# Patient Record
Sex: FEMALE | Race: WHITE | NOT HISPANIC OR LATINO | Employment: PART TIME | URBAN - METROPOLITAN AREA
[De-identification: names, ages, dates, MRNs, and addresses within clinical notes are randomized per-mention and may not be internally consistent; named-entity substitution may affect disease eponyms.]

---

## 2024-07-22 ENCOUNTER — APPOINTMENT (OUTPATIENT)
Dept: CT IMAGING | Facility: CLINIC | Age: 49
End: 2024-07-22
Attending: EMERGENCY MEDICINE

## 2024-07-22 ENCOUNTER — HOSPITAL ENCOUNTER (OUTPATIENT)
Facility: CLINIC | Age: 49
Setting detail: OBSERVATION
Discharge: HOME | End: 2024-07-23
Attending: EMERGENCY MEDICINE | Admitting: INTERNAL MEDICINE

## 2024-07-22 ENCOUNTER — APPOINTMENT (OUTPATIENT)
Dept: CARDIOLOGY | Facility: CLINIC | Age: 49
End: 2024-07-22
Attending: STUDENT IN AN ORGANIZED HEALTH CARE EDUCATION/TRAINING PROGRAM

## 2024-07-22 ENCOUNTER — APPOINTMENT (OUTPATIENT)
Dept: ULTRASOUND IMAGING | Facility: CLINIC | Age: 49
End: 2024-07-22
Attending: EMERGENCY MEDICINE

## 2024-07-22 DIAGNOSIS — D64.9 SYMPTOMATIC ANEMIA: ICD-10-CM

## 2024-07-22 DIAGNOSIS — E03.9 HYPOTHYROIDISM, UNSPECIFIED TYPE: ICD-10-CM

## 2024-07-22 DIAGNOSIS — R55 SYNCOPE, UNSPECIFIED SYNCOPE TYPE: ICD-10-CM

## 2024-07-22 DIAGNOSIS — N92.1 MENORRHAGIA WITH IRREGULAR CYCLE: Primary | ICD-10-CM

## 2024-07-22 LAB
ABO/RH(D): NORMAL
ACANTHOCYTES BLD QL SMEAR: ABNORMAL
ANION GAP SERPL CALCULATED.3IONS-SCNC: 8 MMOL/L (ref 7–15)
ANTIBODY SCREEN: NEGATIVE
ATRIAL RATE - MUSE: 76 BPM
BASOPHILS # BLD AUTO: 0.1 10E3/UL (ref 0–0.2)
BASOPHILS NFR BLD AUTO: 1 %
BLD PROD TYP BPU: NORMAL
BLOOD COMPONENT TYPE: NORMAL
BUN SERPL-MCNC: 10.4 MG/DL (ref 6–20)
CALCIUM SERPL-MCNC: 8.6 MG/DL (ref 8.8–10.4)
CHLORIDE SERPL-SCNC: 103 MMOL/L (ref 98–107)
CODING SYSTEM: NORMAL
CREAT SERPL-MCNC: 0.73 MG/DL (ref 0.51–0.95)
CROSSMATCH: NORMAL
DACRYOCYTES BLD QL SMEAR: SLIGHT
DIASTOLIC BLOOD PRESSURE - MUSE: NORMAL MMHG
EGFRCR SERPLBLD CKD-EPI 2021: >90 ML/MIN/1.73M2
ELLIPTOCYTES BLD QL SMEAR: ABNORMAL
EOSINOPHIL # BLD AUTO: 0.3 10E3/UL (ref 0–0.7)
EOSINOPHIL NFR BLD AUTO: 5 %
ERYTHROCYTE [DISTWIDTH] IN BLOOD BY AUTOMATED COUNT: 23.9 % (ref 10–15)
FRAGMENTS BLD QL SMEAR: ABNORMAL
GLUCOSE SERPL-MCNC: 128 MG/DL (ref 70–99)
HCG SERPL QL: NEGATIVE
HCO3 SERPL-SCNC: 24 MMOL/L (ref 22–29)
HCT VFR BLD AUTO: 13.6 % (ref 35–47)
HGB BLD-MCNC: 3.5 G/DL (ref 11.7–15.7)
HGB BLD-MCNC: 5.5 G/DL (ref 11.7–15.7)
HGB BLD-MCNC: 6.6 G/DL (ref 11.7–15.7)
HOLD SPECIMEN: NORMAL
IMM GRANULOCYTES # BLD: 0 10E3/UL
IMM GRANULOCYTES NFR BLD: 0 %
INTERPRETATION ECG - MUSE: NORMAL
ISSUE DATE AND TIME: NORMAL
LVEF ECHO: NORMAL
LYMPHOCYTES # BLD AUTO: 1.5 10E3/UL (ref 0.8–5.3)
LYMPHOCYTES NFR BLD AUTO: 21 %
MCH RBC QN AUTO: 16.1 PG (ref 26.5–33)
MCHC RBC AUTO-ENTMCNC: 25.7 G/DL (ref 31.5–36.5)
MCV RBC AUTO: 63 FL (ref 78–100)
MONOCYTES # BLD AUTO: 0.5 10E3/UL (ref 0–1.3)
MONOCYTES NFR BLD AUTO: 7 %
NEUTROPHILS # BLD AUTO: 4.6 10E3/UL (ref 1.6–8.3)
NEUTROPHILS NFR BLD AUTO: 66 %
NRBC # BLD AUTO: 0 10E3/UL
NRBC BLD AUTO-RTO: 0 /100
P AXIS - MUSE: 61 DEGREES
PLAT MORPH BLD: ABNORMAL
PLATELET # BLD AUTO: 481 10E3/UL (ref 150–450)
POTASSIUM SERPL-SCNC: 4 MMOL/L (ref 3.4–5.3)
PR INTERVAL - MUSE: 144 MS
QRS DURATION - MUSE: 88 MS
QT - MUSE: 394 MS
QTC - MUSE: 443 MS
R AXIS - MUSE: 80 DEGREES
RBC # BLD AUTO: 2.17 10E6/UL (ref 3.8–5.2)
RBC MORPH BLD: ABNORMAL
SODIUM SERPL-SCNC: 135 MMOL/L (ref 135–145)
SPECIMEN EXPIRATION DATE: NORMAL
SYSTOLIC BLOOD PRESSURE - MUSE: NORMAL MMHG
T AXIS - MUSE: 48 DEGREES
T4 FREE SERPL-MCNC: 0.65 NG/DL (ref 0.9–1.7)
TSH SERPL DL<=0.005 MIU/L-ACNC: 17.83 UIU/ML (ref 0.3–4.2)
UNIT ABO/RH: NORMAL
UNIT NUMBER: NORMAL
UNIT STATUS: NORMAL
UNIT TYPE ISBT: 5100
VENTRICULAR RATE- MUSE: 76 BPM
WBC # BLD AUTO: 7 10E3/UL (ref 4–11)

## 2024-07-22 PROCEDURE — 99285 EMERGENCY DEPT VISIT HI MDM: CPT | Mod: 25

## 2024-07-22 PROCEDURE — 36430 TRANSFUSION BLD/BLD COMPNT: CPT

## 2024-07-22 PROCEDURE — 258N000003 HC RX IP 258 OP 636: Performed by: EMERGENCY MEDICINE

## 2024-07-22 PROCEDURE — 36415 COLL VENOUS BLD VENIPUNCTURE: CPT | Performed by: STUDENT IN AN ORGANIZED HEALTH CARE EDUCATION/TRAINING PROGRAM

## 2024-07-22 PROCEDURE — 93306 TTE W/DOPPLER COMPLETE: CPT | Mod: 26 | Performed by: INTERNAL MEDICINE

## 2024-07-22 PROCEDURE — 84703 CHORIONIC GONADOTROPIN ASSAY: CPT | Performed by: EMERGENCY MEDICINE

## 2024-07-22 PROCEDURE — 87624 HPV HI-RISK TYP POOLED RSLT: CPT | Performed by: OBSTETRICS & GYNECOLOGY

## 2024-07-22 PROCEDURE — 86900 BLOOD TYPING SEROLOGIC ABO: CPT | Performed by: EMERGENCY MEDICINE

## 2024-07-22 PROCEDURE — 250N000013 HC RX MED GY IP 250 OP 250 PS 637: Performed by: STUDENT IN AN ORGANIZED HEALTH CARE EDUCATION/TRAINING PROGRAM

## 2024-07-22 PROCEDURE — 76830 TRANSVAGINAL US NON-OB: CPT

## 2024-07-22 PROCEDURE — 84443 ASSAY THYROID STIM HORMONE: CPT | Performed by: STUDENT IN AN ORGANIZED HEALTH CARE EDUCATION/TRAINING PROGRAM

## 2024-07-22 PROCEDURE — 84439 ASSAY OF FREE THYROXINE: CPT | Performed by: STUDENT IN AN ORGANIZED HEALTH CARE EDUCATION/TRAINING PROGRAM

## 2024-07-22 PROCEDURE — 88305 TISSUE EXAM BY PATHOLOGIST: CPT | Mod: 26 | Performed by: STUDENT IN AN ORGANIZED HEALTH CARE EDUCATION/TRAINING PROGRAM

## 2024-07-22 PROCEDURE — 85018 HEMOGLOBIN: CPT | Performed by: EMERGENCY MEDICINE

## 2024-07-22 PROCEDURE — 250N000013 HC RX MED GY IP 250 OP 250 PS 637: Performed by: EMERGENCY MEDICINE

## 2024-07-22 PROCEDURE — 85018 HEMOGLOBIN: CPT | Performed by: STUDENT IN AN ORGANIZED HEALTH CARE EDUCATION/TRAINING PROGRAM

## 2024-07-22 PROCEDURE — 93306 TTE W/DOPPLER COMPLETE: CPT

## 2024-07-22 PROCEDURE — P9016 RBC LEUKOCYTES REDUCED: HCPCS | Performed by: EMERGENCY MEDICINE

## 2024-07-22 PROCEDURE — 96361 HYDRATE IV INFUSION ADD-ON: CPT | Mod: 59

## 2024-07-22 PROCEDURE — 120N000001 HC R&B MED SURG/OB

## 2024-07-22 PROCEDURE — 76856 US EXAM PELVIC COMPLETE: CPT

## 2024-07-22 PROCEDURE — 70450 CT HEAD/BRAIN W/O DYE: CPT

## 2024-07-22 PROCEDURE — G0145 SCR C/V CYTO,THINLAYER,RESCR: HCPCS | Performed by: OBSTETRICS & GYNECOLOGY

## 2024-07-22 PROCEDURE — P9016 RBC LEUKOCYTES REDUCED: HCPCS | Performed by: STUDENT IN AN ORGANIZED HEALTH CARE EDUCATION/TRAINING PROGRAM

## 2024-07-22 PROCEDURE — 86923 COMPATIBILITY TEST ELECTRIC: CPT | Performed by: STUDENT IN AN ORGANIZED HEALTH CARE EDUCATION/TRAINING PROGRAM

## 2024-07-22 PROCEDURE — 36415 COLL VENOUS BLD VENIPUNCTURE: CPT | Performed by: EMERGENCY MEDICINE

## 2024-07-22 PROCEDURE — 99223 1ST HOSP IP/OBS HIGH 75: CPT | Performed by: STUDENT IN AN ORGANIZED HEALTH CARE EDUCATION/TRAINING PROGRAM

## 2024-07-22 PROCEDURE — 85025 COMPLETE CBC W/AUTO DIFF WBC: CPT | Performed by: EMERGENCY MEDICINE

## 2024-07-22 PROCEDURE — 86923 COMPATIBILITY TEST ELECTRIC: CPT | Performed by: EMERGENCY MEDICINE

## 2024-07-22 PROCEDURE — 80048 BASIC METABOLIC PNL TOTAL CA: CPT | Performed by: EMERGENCY MEDICINE

## 2024-07-22 PROCEDURE — 99207 PR SERVICE NOT STAFFED W/SUPERV PROV: CPT | Performed by: OBSTETRICS & GYNECOLOGY

## 2024-07-22 PROCEDURE — 88305 TISSUE EXAM BY PATHOLOGIST: CPT | Mod: TC | Performed by: OBSTETRICS & GYNECOLOGY

## 2024-07-22 PROCEDURE — 93005 ELECTROCARDIOGRAM TRACING: CPT

## 2024-07-22 PROCEDURE — 96360 HYDRATION IV INFUSION INIT: CPT | Mod: 59

## 2024-07-22 RX ORDER — AMOXICILLIN 250 MG
2 CAPSULE ORAL 2 TIMES DAILY PRN
Status: DISCONTINUED | OUTPATIENT
Start: 2024-07-22 | End: 2024-07-23 | Stop reason: HOSPADM

## 2024-07-22 RX ORDER — NORGESTIMATE AND ETHINYL ESTRADIOL 0.25-0.035
1 KIT ORAL DAILY
Status: DISCONTINUED | OUTPATIENT
Start: 2024-07-22 | End: 2024-07-23 | Stop reason: HOSPADM

## 2024-07-22 RX ORDER — OXYCODONE HYDROCHLORIDE 5 MG/1
5 TABLET ORAL ONCE
Status: COMPLETED | OUTPATIENT
Start: 2024-07-22 | End: 2024-07-22

## 2024-07-22 RX ORDER — ACETAMINOPHEN 325 MG/1
650 TABLET ORAL EVERY 4 HOURS PRN
Status: DISCONTINUED | OUTPATIENT
Start: 2024-07-22 | End: 2024-07-23 | Stop reason: HOSPADM

## 2024-07-22 RX ORDER — B-COMPLEX WITH VITAMIN C
2-3 TABLET ORAL DAILY
COMMUNITY

## 2024-07-22 RX ORDER — LEVOTHYROXINE SODIUM 100 UG/1
100 TABLET ORAL
Status: DISCONTINUED | OUTPATIENT
Start: 2024-07-23 | End: 2024-07-23 | Stop reason: HOSPADM

## 2024-07-22 RX ORDER — ACETAMINOPHEN 650 MG/1
650 SUPPOSITORY RECTAL EVERY 4 HOURS PRN
Status: DISCONTINUED | OUTPATIENT
Start: 2024-07-22 | End: 2024-07-23 | Stop reason: HOSPADM

## 2024-07-22 RX ORDER — CALCIUM CARBONATE 500 MG/1
1000 TABLET, CHEWABLE ORAL 4 TIMES DAILY PRN
Status: DISCONTINUED | OUTPATIENT
Start: 2024-07-22 | End: 2024-07-23 | Stop reason: HOSPADM

## 2024-07-22 RX ORDER — LIDOCAINE 40 MG/G
CREAM TOPICAL
Status: DISCONTINUED | OUTPATIENT
Start: 2024-07-22 | End: 2024-07-23 | Stop reason: HOSPADM

## 2024-07-22 RX ORDER — ONDANSETRON 2 MG/ML
4 INJECTION INTRAMUSCULAR; INTRAVENOUS EVERY 6 HOURS PRN
Status: DISCONTINUED | OUTPATIENT
Start: 2024-07-22 | End: 2024-07-23 | Stop reason: HOSPADM

## 2024-07-22 RX ORDER — AMOXICILLIN 250 MG
1 CAPSULE ORAL 2 TIMES DAILY PRN
Status: DISCONTINUED | OUTPATIENT
Start: 2024-07-22 | End: 2024-07-23 | Stop reason: HOSPADM

## 2024-07-22 RX ORDER — ONDANSETRON 4 MG/1
4 TABLET, ORALLY DISINTEGRATING ORAL EVERY 6 HOURS PRN
Status: DISCONTINUED | OUTPATIENT
Start: 2024-07-22 | End: 2024-07-23 | Stop reason: HOSPADM

## 2024-07-22 RX ADMIN — OXYCODONE HYDROCHLORIDE 5 MG: 5 TABLET ORAL at 13:41

## 2024-07-22 RX ADMIN — ACETAMINOPHEN 650 MG: 325 TABLET, FILM COATED ORAL at 21:54

## 2024-07-22 RX ADMIN — NORGESTIMATE AND ETHINYL ESTRADIOL 1 TABLET: KIT at 16:32

## 2024-07-22 RX ADMIN — SODIUM CHLORIDE 1000 ML: 9 INJECTION, SOLUTION INTRAVENOUS at 06:59

## 2024-07-22 ASSESSMENT — ACTIVITIES OF DAILY LIVING (ADL)
ADLS_ACUITY_SCORE: 35
ADLS_ACUITY_SCORE: 25
ADLS_ACUITY_SCORE: 35
ADLS_ACUITY_SCORE: 22
ADLS_ACUITY_SCORE: 25
ADLS_ACUITY_SCORE: 23
ADLS_ACUITY_SCORE: 35
ADLS_ACUITY_SCORE: 23
ADLS_ACUITY_SCORE: 35

## 2024-07-22 ASSESSMENT — COLUMBIA-SUICIDE SEVERITY RATING SCALE - C-SSRS
2. HAVE YOU ACTUALLY HAD ANY THOUGHTS OF KILLING YOURSELF IN THE PAST MONTH?: NO
1. IN THE PAST MONTH, HAVE YOU WISHED YOU WERE DEAD OR WISHED YOU COULD GO TO SLEEP AND NOT WAKE UP?: NO
6. HAVE YOU EVER DONE ANYTHING, STARTED TO DO ANYTHING, OR PREPARED TO DO ANYTHING TO END YOUR LIFE?: NO

## 2024-07-22 NOTE — CONSULTS
Gynecologic Oncology Consult Note    Name: Aleida Hagan    MRN: [id number]   YOB: 1975         We were asked to see Aleida Hagan at the request of Dr. Pham for evaluation and treatment of abnormal uterine bleeding.        Chief Complaint:   Heavy vaginal bleeding   Loss of consciousness    History is obtained from the patient          History of Present Illness:   Aleida Hagan is a 49 year old  female who is being seen for evaluation of heavy vaginal bleeding.     Pt presents to the ED after an episode of loss of consciousness while in the shower earlier today. Stanford lightheaded any time she lifted her hands above her head and then became acutely dizzy and and had LOC, falling out of the shower onto the floor. Not sure if she hit her head. Daughter heard her hit the floor and arrived shortly thereafter, believes the pt was unconscious for 60-90 seconds total. Pt remembers events leading up to the fall. Not having significant pain thereafter.     This happened in the context of recent h/o 3 weeks of heavy vaginal bleeding. Pt has a long h/o menorrhagia and states periods for her typically last 2 weeks and are associated with pelvic pain, this has been ongoing for many years. She gets one period per month, does not always arrive the day she expects it to (reports overall 21-35 d cycles). In recent months, has been noticing increase in weakness and shortness of breath with activity, occasionally feeling lightheaded with walking even short distances (1/2 city block). This has been slowly worsening over time so she did not think much of it, figured normal. She did not have a period in  which was abnormal for her, but she figured that this may have meant she was approaching menopause. Has noticed some heat/cold intolerance in the last year and overall periods have been a bit shorter than previously which she felt were likely signs of menopause as well. At the beginning of July, a  period started and was even heavier than typical for her. In the first few days she had one episode of passing large clots, enough to fill both her hands. She subsequently had heavy ongoing bleeding requiring her to stay seated on the toilet because it was so heavy. She was bleeding through a super tampon in 15 minutes. This got a bit lighter through the month but remained fairly heavy and bothersome and her symptoms of anemia (lightheadedness, shortness of breath, weakness) worsened throughout the month. 3 days ago she elected to start taking some of her daughters prescribed cOCPs (tri-sprintec) which improved her bleeding significantly, though it has still been present. This morning she was actually feeling much better from a lightheadedness standpoint, until her episode of LOC in the shower which prompted her to come in.          Obstetric History:     H/o  x2   H/o tubal ectopic x1 tx with surgery ()           Past Medical History:   No past medical history on file.  Reports h/o goiter, previously on Synthroid (has not taken x20 years)  H/o frequent kidney infections in early adulthood   H/o depression per chart review  H/o endometriosis          Past Surgical History:   No past surgical history on file.  H/o postpartum tubal ligation  H/o tubal surgery for ectopic pregnancy, pt unsure what surgery exactly was done but reports subsequent pelvic infections          Social History:     Social History     Tobacco Use    Smoking status: Not on file    Smokeless tobacco: Not on file   Substance Use Topics    Alcohol use: Not on file            Family History:   No family history of  cancers (ovarian, cervical, uterine, breast or colon). No family h/o VTE.          Allergies:   No Known Allergies         Medications:     No current facility-administered medications for this encounter.     Current Outpatient Medications   Medication Sig Dispense Refill    MAGNESIUM GLYCINATE PO Take 2 mLs by mouth daily  600 mg glycinate  300 mg taurate  300 mg citrate      NONFORMULARY Lion's Volodymyr drops daily      Pediatric Multivitamins-Iron (FRUITY CHEWS/IRON) CHEW Take 4-5 chew tab by mouth daily Ferrous fumarate 18 mg per 3 gummies  Also contains, Niacin, Vitamin B6, Folate, vitamin B12, pantothenic acid, zinc               Review of Systems:    ROS: 10 point ROS negative other than those noted above in the HPI.       Physical Exam:     Vitals:    07/22/24 1117 07/22/24 1132 07/22/24 1202 07/22/24 1217   BP: (!) 144/74 120/78 139/71    Pulse: 72 65 87 73   Resp: 13 16 12 16   Temp:       TempSrc:       SpO2:  96%  100%     General: NAD, good color, appears comfortable  Resp: no respiratory distress, breathing comfortably on room air   CV: heart rate regular, warm and well perfused   Abdomen: soft, non-distended, non tender. =  : normal external genitalia. Normal vaginal mucosa. Cervix appears multiparous and large without masses or lesions. No active bleeding but small amount of dark blood noted in the posterior fornix. Bimanual exam notable for enlarged (10-12wk size) uterus slightly deviated to pt left with mild fundal tenderness to palpation.   Ext: Warm, well perfused, no edema  Neuro: appears intact grossly moving all 4 extremities equally.  Skin: No rashes or ecchymoses noted.      Endometrial biopsy:   A speculum was placed in the vagina. A small amount of active bleeding was noted at the external os. A pap smear was collected in the usual fashion. A tenaculum was placed at 12 o'clock on the anterior lip of the cervix. An endometrial pipelle was inserted into the endometrial cavity which sounded to a length of 7 cm. Am endometrial biopsy was collected. A second pass was completed. The sample appeared to include both blood and tissue. The tenaculum was removed. A small amount of ongoing bleeding was noted at end of the procedure. The samples were sent to pathology. Pt tolerated the procedure well.       Data     Results  for orders placed or performed during the hospital encounter of 07/22/24 (from the past 24 hour(s))   EKG 12-lead, tracing only   Result Value Ref Range    Systolic Blood Pressure  mmHg    Diastolic Blood Pressure  mmHg    Ventricular Rate 76 BPM    Atrial Rate 76 BPM    SC Interval 144 ms    QRS Duration 88 ms     ms    QTc 443 ms    P Axis 61 degrees    R AXIS 80 degrees    T Axis 48 degrees    Interpretation ECG       Sinus rhythm  Normal ECG  No previous ECGs available  Confirmed by GENERATED REPORT, COMPUTER ([id number]),  Nadine Matthews ([id number]) on 7/22/2024 8:58:32 AM     CBC with platelets differential    Narrative    The following orders were created for panel order CBC with platelets differential.  Procedure                               Abnormality         Status                     ---------                               -----------         ------                     CBC with platelets and d...[[id number]]  Abnormal            Final result               RBC and Platelet Morphology[[id number]]  Abnormal            Final result                 Please view results for these tests on the individual orders.   ABO/Rh type and screen    Narrative    The following orders were created for panel order ABO/Rh type and screen.  Procedure                               Abnormality         Status                     ---------                               -----------         ------                     Adult Type and Screen[[id number]]                            Final result                 Please view results for these tests on the individual orders.   Basic metabolic panel   Result Value Ref Range    Sodium 135 135 - 145 mmol/L    Potassium 4.0 3.4 - 5.3 mmol/L    Chloride 103 98 - 107 mmol/L    Carbon Dioxide (CO2) 24 22 - 29 mmol/L    Anion Gap 8 7 - 15 mmol/L    Urea Nitrogen 10.4 6.0 - 20.0 mg/dL    Creatinine 0.73 0.51 - 0.95 mg/dL    GFR Estimate >90 >60 mL/min/1.73m2    Calcium 8.6 (L) 8.8 - 10.4 mg/dL     Glucose 128 (H) 70 - 99 mg/dL   HCG qualitative Blood   Result Value Ref Range    hCG Serum Qualitative Negative Negative   [place] Draw    Narrative    The following orders were created for panel order [place] Draw.  Procedure                               Abnormality         Status                     ---------                               -----------         ------                     Extra Blue Top Tube[[id number]]                              Final result                 Please view results for these tests on the individual orders.   CBC with platelets and differential   Result Value Ref Range    WBC Count 7.0 4.0 - 11.0 10e3/uL    RBC Count 2.17 (L) 3.80 - 5.20 10e6/uL    Hemoglobin 3.5 (LL) 11.7 - 15.7 g/dL    Hematocrit 13.6 (L) 35.0 - 47.0 %    MCV 63 (L) 78 - 100 fL    MCH 16.1 (L) 26.5 - 33.0 pg    MCHC 25.7 (L) 31.5 - 36.5 g/dL    RDW 23.9 (H) 10.0 - 15.0 %    Platelet Count 481 (H) 150 - 450 10e3/uL    % Neutrophils 66 %    % Lymphocytes 21 %    % Monocytes 7 %    % Eosinophils 5 %    % Basophils 1 %    % Immature Granulocytes 0 %    NRBCs per 100 WBC 0 <1 /100    Absolute Neutrophils 4.6 1.6 - 8.3 10e3/uL    Absolute Lymphocytes 1.5 0.8 - 5.3 10e3/uL    Absolute Monocytes 0.5 0.0 - 1.3 10e3/uL    Absolute Eosinophils 0.3 0.0 - 0.7 10e3/uL    Absolute Basophils 0.1 0.0 - 0.2 10e3/uL    Absolute Immature Granulocytes 0.0 <=0.4 10e3/uL    Absolute NRBCs 0.0 10e3/uL    Narrative    Sent for Review by Pathologist. Review comments will be entered. Results will be updated after review as applicable.  Slide reviewed by pathologist, VEGA RODAS MD on 7/22/24         Adult Type and Screen   Result Value Ref Range    ABO/RH(D) O POS     Antibody Screen Negative Negative    SPECIMEN EXPIRATION DATE [id number]    Extra Blue Top Tube   Result Value Ref Range    Hold Specimen [facility]    RBC and Platelet Morphology   Result Value Ref Range    RBC Morphology Confirmed RBC Indices     Platelet Assessment  Automated  Count Confirmed. Platelet morphology is normal.     Automated Count Confirmed. Platelet morphology is normal.    Acanthocytes Moderate (A) None Seen    Elliptocytes Moderate (A) None Seen    RBC Fragments Moderate (A) None Seen    Teardrop Cells Slight (A) None Seen   Prepare red blood cells (unit)   Result Value Ref Range    Blood Component Type Red Blood Cells     Product Code O7688C30     Unit Status Transfused     Unit Number [id number]     CROSSMATCH Compatible     CODING SYSTEM [id number]     ISSUE DATE AND TIME 14175005508384     UNIT ABO/RH O+     UNIT TYPE ISBT 5100    Prepare red blood cells (unit)   Result Value Ref Range    Blood Component Type Red Blood Cells     Product Code M4697W47     Unit Status Transfused     Unit Number [id number]     CROSSMATCH Compatible     CODING SYSTEM [id number]     ISSUE DATE AND TIME 39020226592465     UNIT ABO/RH O+     UNIT TYPE ISBT 5100    US Pelvis Cmplt w Transvag & Doppler LmtPel Duplex Limited    Narrative    US PELVIS COMPLETE W TRANSVAGINAL AND DOPPLER LIMITED 7/22/2024 8:11  AM    CLINICAL HISTORY: Pelvic pain  TECHNIQUE: Transabdominal scans were performed. Endovaginal ultrasound  was performed to better visualize the adnexa. Color and spectral  Doppler images of both ovaries were also performed.    COMPARISON: None.    FINDINGS:    UTERUS: 12.4 x 10.5 x 10.3 cm. Enlarged in normal position. The  uterine body and fundus is obscured with questionable associated  fibroid measuring up to 9.3 cm. Multiple nabothian cysts in the  cervix.    ENDOMETRIUM: Not well visualized.    RIGHT OVARY: 5.5 x 3.8 x 2.2 cm. Cystic lesion in the right ovary  measuring up to 4.6 cm. While this is primarily simple-appearing,  there may be a small peripheral calcified nodule. Normal arterial and  venous waveforms are identified in the peripheral ovarian parenchyma.    LEFT OVARY: Not visualized, likely obscured by overlying bowel gas.    No significant free fluid.      Impression     IMPRESSION:  1.  Enlarged, heterogeneous uterus, could be replaced by a large  fibroid versus adenomyoma.  2.  Endometrium is not well visualized, most likely obscured by #1 but  could consider further evaluation with MRI to include underlying mass  or lesion, if clinically indicated on a nonemergent/outpatient basis.  3.  Relatively simple appearing right ovarian cystic lesion with  possible small peripheral calcification, consider attention on  follow-up imaging. No evidence of right ovarian torsion  4.  Nonvisualized left ovary.    ROSA SHARMA MD         SYSTEM ID:  [id number]   CT Head w/o Contrast    Narrative    CT SCAN OF THE HEAD WITHOUT CONTRAST   2024 9:22 AM     HISTORY: Fall, syncope.    TECHNIQUE:  Axial images of the head and coronal reformations without  IV contrast material. Radiation dose for this scan was reduced using  automated exposure control, adjustment of the mA and/or kV according  to patient size, or iterative reconstruction technique.    COMPARISON: None.    FINDINGS: There is no evidence of intracranial hemorrhage, mass, acute  infarct or anomaly. The ventricles are normal in size, shape and  configuration. The brain parenchyma and subarachnoid spaces are  normal.     The visualized portions of the sinuses and mastoids appear normal. The  bony calvarium and bones of the skull base appear intact.       Impression    IMPRESSION:  No evidence of acute intracranial hemorrhage, mass, or  herniation.     JO-ANN GRAJEDA MD         SYSTEM ID:  [id number]       Assessment and Recommendations   Impression:   Aleida Hagan is a 49 year old  female who presents after an episode of loss of consciousness in the setting of acute on chronic heavy uterine bleeding. On admission, work up notable for significant anemia with hgb 3.5 and pelvic US showing an enlarged (12 cm), heterogenous uterus c/w fibroid vs adenomyosis without good visualization of the endometrium. Pt endorses long history  of semi-regular cycles with menorrhagia. Bleeding in July heavier than her baseline and has lasted 3 weeks while typical cycles for her are 1-2. Given the amount of bleeding she describes this month and in previous, suspect she has chronically anemic for some time, with acute worsening in the setting of even heavier than typical vaginal bleeding. On my review of US findings appears c/w adenomyosis which would explain her long h/o heavy and painful periods. She will require endometrial sampling to r/o malignancy in the setting of very heavy bleeding in the perimenopausal period. Final recommendations would be based on histologic diagnosis, however even if adenomyosis, definitive management would be w/ hysterectomy though other non-surgical options exist as well. For her acute blood loss anemia, recommend continuation of cOCPs (which pt started taking in the last 3 days to good effect). Would obtain labs to evaluate thyroid function as hypothyroidism can contribute to AUB as well and pt endorses h/o significant thyroid disease.       Plan:   - admit to medicine  - agree with transfusion to hgb>7 per primary team   - start continuous combined OCPs today for management of heavy bleeding (sprintec is an appropriate option)   - consider course of scheduled NSAIDs which can also help w/ discomfort/bleeding of adenomyosis  - obtain thyroid function labs   - f/up endometrial biopsy  - f/up pap w/ HPV co-testing      The above recommendations were personally communicated to pts primary inpatient provider Dr. Pham     Thank you for allowing us to be involved in the care of your patient. Gyn/onc will continue to follow. Please do not hesitate to give us a call with further questions.     Gyn Onc Team Pager:  630 AM-6PM: [phone]  6PM-630 AM: [phone]     Patient care plan discussed under supervision of Dr. Dunne.    Lovely Coreas MD  OB/GYN PGY-3  7/22/2024 2:12 PM        Discussed patient with resident.  Pap with EMB  performed.  Recommend TSH level.  Transfuse per medicine to Hb >7.  Recommend starting OCP for cycle regulation.  Await pathology for final treatment recommendations.    Suyapa Dunne MD  Gynecologic Oncology  [facility]

## 2024-07-22 NOTE — ED PROVIDER NOTES
Emergency Department Note      History of Present Illness     Chief Complaint   Syncope      HPI   Aleida Hagan is a 49 year old female who presents to the ED following a syncopal episode. The patient reports that she has been having heavy vaginal bleeding for the last 3 weeks which has caused her to have severe dizziness and lightheadedness. She explains that 2.5 weeks ago she had a significant clot passed that has progressed to smaller, persistent clots with her bleeding. Today she reports that she was in the shower this morning when she realized she could not reach her hand above her head without feeling lightheaded, so she sat on the floor. After resting for a few minutes she got up and reached for a towel when she felt lightheaded and had a syncopal episode, falling to the bathroom floor. Daughter reports that she was out for about a minute before she was assisted up. She endorses shoulder pain after the fall but denies headache and new neck pain. For the last 3 days she has taken her daughter's birth control which has helped her bleeding somewhat. She was diagnosed with endometriosis when she was 28, and she also has history of tubal ligation. She is not on blood thinners.     Independent Historian   None    Review of External Notes   occ med note from 9/17/2021    Past Medical History     Medical History and Problem List   Endometriosis    Medications   The patient is not currently taking any prescribed medications.    Surgical History:  Tubal ligation    Physical Exam     Patient Vitals for the past 24 hrs:   BP Temp Temp src Pulse Resp SpO2   07/22/24 0904 115/70 97.7  F (36.5  C) Oral 67 18 95 %   07/22/24 0847 116/69 97.9  F (36.6  C) Oral 71 18 94 %   07/22/24 0845 116/69 -- -- 66 11 98 %   07/22/24 0828 107/63 97.9  F (36.6  C) -- 72 18 --   07/22/24 0722 -- -- -- 74 18 94 %   07/22/24 0715 -- -- -- 70 10 94 %   07/22/24 0712 -- -- -- 71 14 92 %   07/22/24 0702 108/50 -- -- 74 26 --   07/22/24 0635  100/45 97.8  F (36.6  C) Oral 83 14 99 %     Physical Exam  General: Patient is alert and normal appearing.  HEENT: Head atraumatic    Eyes: pupils equal and reactive. Conjunctiva clear   Nares: patent   Oropharynx: no lesions, uvula midline, no palatal draping, normal voice, no trismus  Neck: Supple without lymphadenopathy, no meningismus  Chest: Heart regular rate and rhythm.   Lungs: Equal clear to auscultation with no wheeze or rales  Abdomen: Soft, non tender, nondistended, normal bowel sounds  Back: No costovertebral angle tenderness, no midline C, T or L spine tenderness  Neuro: Grossly nonfocal, normal speech, strength equal bilaterally, CN 2-12 intact  Extremities: No deformities, equal radial and DP pulses. No clubbing, cyanosis.  No edema  Skin: Pale, warm and dry with no rash.       Diagnostics     Lab Results   Labs Ordered and Resulted from Time of ED Arrival to Time of ED Departure   BASIC METABOLIC PANEL - Abnormal       Result Value    Sodium 135      Potassium 4.0      Chloride 103      Carbon Dioxide (CO2) 24      Anion Gap 8      Urea Nitrogen 10.4      Creatinine 0.73      GFR Estimate >90      Calcium 8.6 (*)     Glucose 128 (*)    CBC WITH PLATELETS AND DIFFERENTIAL - Abnormal    WBC Count 7.0      RBC Count 2.17 (*)     Hemoglobin 3.5 (*)     Hematocrit 13.6 (*)     MCV 63 (*)     MCH 16.1 (*)     MCHC 25.7 (*)     RDW 23.9 (*)     Platelet Count 481 (*)     NRBCs per 100 WBC 0      Absolute NRBCs 0.0     RBC AND PLATELET MORPHOLOGY - Abnormal    RBC Morphology Confirmed RBC Indices      Platelet Assessment        Value: Automated Count Confirmed. Platelet morphology is normal.    Acanthocytes Moderate (*)     Elliptocytes Moderate (*)     RBC Fragments Moderate (*)     Teardrop Cells Slight (*)    HCG QUALITATIVE PREGNANCY - Normal    hCG Serum Qualitative Negative     TYPE AND SCREEN, ADULT    ABO/RH(D) O POS      Antibody Screen Negative      SPECIMEN EXPIRATION DATE [id number]      PREPARE RED BLOOD CELLS (UNIT)    Blood Component Type Red Blood Cells      Product Code K3926B82      Unit Status Transfused      Unit Number [id number]      CROSSMATCH Compatible      CODING SYSTEM [id number]      ISSUE DATE AND TIME 52915472369338      UNIT ABO/RH O+      UNIT TYPE ISBT 5100     PREPARE RED BLOOD CELLS (UNIT)    Blood Component Type Red Blood Cells      Product Code B2146C25      Unit Status Ready for issue      Unit Number [id number]      CROSSMATCH Compatible      CODING SYSTEM [id number]     PREPARE RED BLOOD CELLS (UNIT)   TRANSFUSE RED BLOOD CELLS (UNIT)   TRANSFUSE RED BLOOD CELLS (UNIT)   ABO/RH TYPE AND SCREEN       Imaging   US Pelvis Cmplt w Transvag & Doppler LmtPel Duplex Limited   Final Result   IMPRESSION:   1.  Enlarged, heterogeneous uterus, could be replaced by a large   fibroid versus adenomyoma.   2.  Endometrium is not well visualized, most likely obscured by #1 but   could consider further evaluation with MRI to include underlying mass   or lesion, if clinically indicated on a nonemergent/outpatient basis.   3.  Relatively simple appearing right ovarian cystic lesion with   possible small peripheral calcification, consider attention on   follow-up imaging. No evidence of right ovarian torsion   4.  Nonvisualized left ovary.      ROSA SHARMA MD            SYSTEM ID:  [id number]      CT Head w/o Contrast    (Results Pending)       EKG   ECG taken at 0651, ECG read at 0652  Normal sinus rhythm   Normal ECG   Rate 76 bpm. FL interval 144 ms. QRS duration 88 ms. QT/QTc 394/443 ms. P-R-T axes 61 80 48.    Independent Interpretation   CT Head: No intracranial hemorrhage.    ED Course      Medications Administered   Medications   sodium chloride 0.9% BOLUS 1,000 mL (0 mLs Intravenous Stopped 7/22/24 0835)       Procedures   Procedures     Discussion of Management   0906 I spoke with Dr. Pham, hospitalist, who accepts the patient.      ED Course   ED Course as of 07/22/24 0911    Mon Jul 22, 2024   0642 I obtained history and examined the patient as noted above   0813 I rechecked the patient and explained findings.         Optional/Additional Documentation  None    Medical Decision Making / Diagnosis     CMS Diagnoses: None    MIPS       None    MDM   Aleida Hagan is a 49 year old female presents emergency department syncope.  She has had increased fatigue with exertion following 3 weeks of heavy menstrual bleeding.  States she has irregular menstrual periods and felt she was likely perimenopausal.  History of heavy periods for a while but this was more extreme and longer in duration.  Today she was in the shower and had prodrome of syncope and ultimately had a syncopal episode.  Unclear if she hit her head.  Head CT to me shows no intracranial hemorrhage but final read is pending at this time.  Pelvic ultrasound with enlarged heterogeneous uterus with possible large fibroid versus adenoma.  Likely representing her menorrhagia.  Patient was found to be anemic with a hemoglobin of 3.5.  Hemodynamically stable.  Agrees to transfusion and 2 units PRBCs are ordered.  Will plan to admit for transfusion and OB consultation for menorrhagia.  Patient is agreeable with plan for admission.  All questions and concerns addressed.    Disposition   The patient was admitted to the hospital.     Diagnosis     ICD-10-CM    1. Menorrhagia with irregular cycle  N92.1       2. Symptomatic anemia  D64.9       3. Syncope, unspecified syncope type  R55              Scribe Disclosure:  IInder, am serving as a scribe  for Jah Yu at 9:12 AM on 7/22/2024  I, Jah Yu, am serving as a scribe at 9:12 AM on 7/22/2024 to document services personally performed by Kelly Souza MD based on my observations and the provider's statements to me.        Kelly Souza MD  07/22/24 8959

## 2024-07-22 NOTE — LETTER
[place] [address] [facility]  [address]  [facility] [address]  Phone: [phone]    July 23, 2024        Aleida Hagan  [address]   [facility] [address]          To whom it may concern:    RE: Aleida Hagan    Patient was hospitalized for medical necessity from 7/22/24-7/23/24.  She needs to be off work until Monday, 7/29/24.  She can return to work on 7/29/24 without restrictions.     Please contact me for questions or concerns.      Sincerely,      Martha Urena D.O.

## 2024-07-22 NOTE — H&P
[facility]    History and Physical - Hospitalist Service       Date of Admission:  7/22/2024    Assessment & Plan      Aleida Hagan is a 49 year old female with past medical history significant for depression/anxiety and hypothyroidism admitted on 7/22/2024 with abnormal uterine bleeding and acute blood loss anemia.     Abnormal uterine bleeding   Acute blood loss anemia   Hx of endometriosis, menorrhagia   Pt presented to the ED after a syncopal episode (see below). She reported for the past 3-4 weeks she has been having very heavy vaginal bleeding which has caused her to have dizziness and light-headedness. She does note a history of endometriosis and heavy periods at baseline, but this is much worse than normal.  * Initial hemoglobin in the ED is 3.5  *Pelvic ultrasound performed and showed an enlarged, heterogeneous uterus, which could be replaced by large fibroid versus adenomyoma.  The endometrium is not well-visualized.   * Pt transfused 2 units of PRBCs in the ED  -Admit to inpatient  -Gynecology consult for consideration of hormonal therapy and additional workup/treatment regarding abnormal imaging findings.  -Trend hemoglobin  -Transfuse PRBCs for hemoglobin less than 7 conditional orders placed      Syncope 2/2 above  Patient presents to the emergency department after syncopal episode.  She reports she was in the shower this morning started feeling quite lightheaded/dizzy she actually sat down in the shower intentionally due to the dizziness but then when she tried to stand back up she syncopized.  * Syncope is certainly 2/2 severe anemia, however pt does report a family hx of cardiac disease.   * No reported injury as a result of the fall   - Cardiac monitoring  - Transfuse for hgb <7 as noted above   - Given family cardiac hx will obtain echo for completeness     ADDENDUM:   Hypothyroidism  TSH elevated to 17.83. T4 low at 0.65.   - Start Levothyroxine 100mcg daily.      Financial Concerns  Pt does not have insurance   - Social Work consult appreciated     Diet: Regular Diet   DVT Prophylaxis: Pneumatic Compression Devices  Galaviz Catheter: Not present  Lines: None     Cardiac Monitoring: None  Code Status:Full Code     Clinically Significant Risk Factors Present on Admission                     # Anemia: based on hgb <11        Disposition Plan     Medically Ready for Discharge: Anticipated in 2-4 Days           Georgie Pham MD  Hospitalist Service  [facility]  Securely message with [employer] (more info)  Text page via [employer] Paging/Directory     ______________________________________________________________________    Chief Complaint   Syncope     History is obtained from the patient    History of Present Illness   Aleida Hagan is a 49 year old female who presents to the emergency department after syncopal episode.  She reports she was in the shower this morning started feeling quite lightheaded/dizzy she actually sat down in the shower intentionally due to the dizziness but then when she tried to stand back up she syncopized. She woke up on the floor with her dog licking her. She does not think she injured herself. She denies any new or significant pain. She does however note that she has been having fairly significant vaginal bleeding for the past 4 weeks. She has a hx of endometriosis and menorrhagia. She denies a hx of anemia, but reports taking iron supplementation when she gets her periods. This current episode has been much more severe than normal in terms of duration and heaviness of bleeding. She did actually start taking some of her daughter's birth control about three days ago to try to slow the bleeding. She did not seek care sooner because she is not insured. She does not get regular pelvic examinations or pap smears for the same reason.       Past Medical History    No past medical history on file.    Past Surgical History   No  past surgical history on file.    Prior to Admission Medications   Prior to Admission Medications   Prescriptions Last Dose Informant Patient Reported? Taking?   MAGNESIUM GLYCINATE PO 7/21/2024  Yes Yes   Sig: Take 2 mLs by mouth daily 600 mg glycinate  300 mg taurate  300 mg citrate   NONFORMULARY 7/21/2024  Yes Yes   Sig: Jarek's Volodymyr drops daily   Pediatric Multivitamins-Iron (FRUITY CHEWS/IRON) CHEW 7/21/2024 at am  Yes Yes   Sig: Take 4-5 chew tab by mouth daily Ferrous fumarate 18 mg per 3 gummies  Also contains, Niacin, Vitamin B6, Folate, vitamin B12, pantothenic acid, zinc      Facility-Administered Medications: None        Review of Systems    The 10 point Review of Systems is negative other than noted in the HPI or here.     Physical Exam   Vital Signs: Temp: 97.9  F (36.6  C) Temp src: Oral BP: 117/63 Pulse: 69   Resp: 18 SpO2: 93 % O2 Device: None (Room air)    Weight: 0 lbs 0 oz    Constitutional: Awake, alert, cooperative, no apparent distress.  Eyes: Conjunctiva and pupils examined and normal.  HEENT: Moist mucous membranes, normal dentition.  Respiratory: Clear to auscultation bilaterally, no crackles or wheezing.  Cardiovascular: Regular rate and rhythm, normal S1 and S2, and II/VI systolic murmur noted.  GI: Soft, non-distended, non-tender, normal bowel sounds.  Skin: No rashes, no cyanosis, no edema.  Musculoskeletal: No joint swelling, erythema or tenderness.  Neurologic: Cranial nerves 2-12 intact, normal strength and sensation.  Psychiatric: Alert, oriented to person, place and time, no obvious anxiety or depression.    Medical Decision Making       75 MINUTES SPENT BY ME on the date of service doing chart review, history, exam, documentation & further activities per the note.      Data     I have personally reviewed the following data over the past 24 hrs:    7.0  \   3.5 (LL)   / 481 (H)     135 103 10.4 /  128 (H)   4.0 24 0.73 \       Imaging results reviewed over the past 24 hrs:   Recent  Results (from the past 24 hour(s))   US Pelvis Cmplt w Transvag & Doppler LmtPel Duplex Limited    Narrative    US PELVIS COMPLETE W TRANSVAGINAL AND DOPPLER LIMITED 7/22/2024 8:11  AM    CLINICAL HISTORY: Pelvic pain  TECHNIQUE: Transabdominal scans were performed. Endovaginal ultrasound  was performed to better visualize the adnexa. Color and spectral  Doppler images of both ovaries were also performed.    COMPARISON: None.    FINDINGS:    UTERUS: 12.4 x 10.5 x 10.3 cm. Enlarged in normal position. The  uterine body and fundus is obscured with questionable associated  fibroid measuring up to 9.3 cm. Multiple nabothian cysts in the  cervix.    ENDOMETRIUM: Not well visualized.    RIGHT OVARY: 5.5 x 3.8 x 2.2 cm. Cystic lesion in the right ovary  measuring up to 4.6 cm. While this is primarily simple-appearing,  there may be a small peripheral calcified nodule. Normal arterial and  venous waveforms are identified in the peripheral ovarian parenchyma.    LEFT OVARY: Not visualized, likely obscured by overlying bowel gas.    No significant free fluid.      Impression    IMPRESSION:  1.  Enlarged, heterogeneous uterus, could be replaced by a large  fibroid versus adenomyoma.  2.  Endometrium is not well visualized, most likely obscured by #1 but  could consider further evaluation with MRI to include underlying mass  or lesion, if clinically indicated on a nonemergent/outpatient basis.  3.  Relatively simple appearing right ovarian cystic lesion with  possible small peripheral calcification, consider attention on  follow-up imaging. No evidence of right ovarian torsion  4.  Nonvisualized left ovary.    ROSA SHARMA MD         SYSTEM ID:  [id number]   CT Head w/o Contrast    Narrative    CT SCAN OF THE HEAD WITHOUT CONTRAST   7/22/2024 9:22 AM     HISTORY: Fall, syncope.    TECHNIQUE:  Axial images of the head and coronal reformations without  IV contrast material. Radiation dose for this scan was reduced  using  automated exposure control, adjustment of the mA and/or kV according  to patient size, or iterative reconstruction technique.    COMPARISON: None.    FINDINGS: There is no evidence of intracranial hemorrhage, mass, acute  infarct or anomaly. The ventricles are normal in size, shape and  configuration. The brain parenchyma and subarachnoid spaces are  normal.     The visualized portions of the sinuses and mastoids appear normal. The  bony calvarium and bones of the skull base appear intact.       Impression    IMPRESSION:  No evidence of acute intracranial hemorrhage, mass, or  herniation.

## 2024-07-22 NOTE — ED NOTES
[facility]    ED Nurse Handoff Report    ED Chief complaint: Syncope      ED Diagnosis:   Final diagnoses:   Menorrhagia with irregular cycle   Symptomatic anemia   Syncope, unspecified syncope type       Code Status: Full Code    Allergies: No Known Allergies    Patient Story:  Pt reports having her period for 4 weeks with heavy bleeding. Today about 1 hour PTA she had a syncopal episode, hitting her head and having LOC for about 1 minute. The pt has been experiencing lightheadedness for a few weeks. US and CT pending. 2 units of blood ordered and started.     Focused Assessment:    Pt is A&Ox4, normally independent, today is 1x staff assist due to lightheadedness. Pt receiving blood. VS normal.     Labs Ordered and Resulted from Time of ED Arrival to Time of ED Departure   BASIC METABOLIC PANEL - Abnormal       Result Value    Sodium 135      Potassium 4.0      Chloride 103      Carbon Dioxide (CO2) 24      Anion Gap 8      Urea Nitrogen 10.4      Creatinine 0.73      GFR Estimate >90      Calcium 8.6 (*)     Glucose 128 (*)    CBC WITH PLATELETS AND DIFFERENTIAL - Abnormal    WBC Count 7.0      RBC Count 2.17 (*)     Hemoglobin 3.5 (*)     Hematocrit 13.6 (*)     MCV 63 (*)     MCH 16.1 (*)     MCHC 25.7 (*)     RDW 23.9 (*)     Platelet Count 481 (*)     NRBCs per 100 WBC 0      Absolute NRBCs 0.0     RBC AND PLATELET MORPHOLOGY - Abnormal    RBC Morphology Confirmed RBC Indices      Platelet Assessment        Value: Automated Count Confirmed. Platelet morphology is normal.    Acanthocytes Moderate (*)     Elliptocytes Moderate (*)     RBC Fragments Moderate (*)     Teardrop Cells Slight (*)    HCG QUALITATIVE PREGNANCY - Normal    hCG Serum Qualitative Negative     TYPE AND SCREEN, ADULT    ABO/RH(D) O POS      Antibody Screen Negative      SPECIMEN EXPIRATION DATE [id number]     PREPARE RED BLOOD CELLS (UNIT)    Blood Component Type Red Blood Cells      Product Code F8523Q95      Unit  Status Transfused      Unit Number [id number]      CROSSMATCH Compatible      CODING SYSTEM [id number]      ISSUE DATE AND TIME [id number]      UNIT ABO/RH O+      UNIT TYPE ISBT 5100     PREPARE RED BLOOD CELLS (UNIT)    Blood Component Type Red Blood Cells      Product Code G5117V78      Unit Status Ready for issue      Unit Number [id number]      CROSSMATCH Compatible      CODING SYSTEM [id number]     PREPARE RED BLOOD CELLS (UNIT)   TRANSFUSE RED BLOOD CELLS (UNIT)   TRANSFUSE RED BLOOD CELLS (UNIT)   ABO/RH TYPE AND SCREEN       US Pelvis Cmplt w Transvag & Doppler LmtPel Duplex Limited   Final Result   IMPRESSION:   1.  Enlarged, heterogeneous uterus, could be replaced by a large   fibroid versus adenomyoma.   2.  Endometrium is not well visualized, most likely obscured by #1 but   could consider further evaluation with MRI to include underlying mass   or lesion, if clinically indicated on a nonemergent/outpatient basis.   3.  Relatively simple appearing right ovarian cystic lesion with   possible small peripheral calcification, consider attention on   follow-up imaging. No evidence of right ovarian torsion   4.  Nonvisualized left ovary.      ROSA SHARMA MD            SYSTEM ID:  [id number]      CT Head w/o Contrast    (Results Pending)         Treatments and/or interventions provided:    Blood transfusion. 2 units ordered. 1st unit started at 0830.   Medications   sodium chloride 0.9% BOLUS 1,000 mL (0 mLs Intravenous Stopped 7/22/24 0835)       Patient's response to treatments and/or interventions:    Continuing to monitor for adverse reactions of blood transfusion.     To be done/followed up on inpatient unit:   See any in-patient orders    Does this patient have any cognitive concerns?: none    Activity level - Baseline/Home:    Independent    Activity Level - Current:    Stand with Assist    Patient's Preferred language: English     Needed?: No    Isolation: None  Infection: Not  Applicable  Patient tested for COVID 19 prior to admission: NO    Bariatric?: No    Vital Signs:   Vitals:    07/22/24 0712 07/22/24 0715 07/22/24 0722 07/22/24 0828   BP:    107/63   Pulse: 71 70 74 72   Resp: 14 10 18 18   Temp:    97.9  F (36.6  C)   TempSrc:       SpO2: 92% 94% 94%        Cardiac Rhythm:     Was the PSS-3 completed:   Yes  What interventions are required if any?               Family Comments: Daughter at bedside  OBS brochure/video discussed/provided to patient/family: N/A              Name of person given brochure if not patient:               Relationship to patient:     For the majority of the shift this patient's behavior was Green.  Behavioral interventions performed were .    ED NURSE PHONE NUMBER: *[phone]

## 2024-07-22 NOTE — PROGRESS NOTES
Pharmacist Admission Medication History    Admission medication history is complete. The information provided in this note is only as accurate as the sources available at the time of the update.    Information Source(s): Patient and Family member via in-person    Pertinent Information: Patient reported taking her daughter's birth control pills for 3 days PTA - Brand name Tri-Sprintec. Total only 3 pills.    Changes made to PTA medication list:  Added: all meds  Deleted: None  Changed: None    Allergies reviewed with patient and updates made in EHR: no    Medication History Completed By: Dorcas Sherman RPH 7/22/2024 9:20 AM    PTA Med List   Medication Sig Last Dose    MAGNESIUM GLYCINATE PO Take 2 mLs by mouth daily 600 mg glycinate  300 mg taurate  300 mg citrate 7/21/2024    NONFORMULARY Lion's Volodymyr drops daily 7/21/2024    Pediatric Multivitamins-Iron (FRUITY CHEWS/IRON) CHEW Take 4-5 chew tab by mouth daily Ferrous fumarate 18 mg per 3 gummies  Also contains, Niacin, Vitamin B6, Folate, vitamin B12, pantothenic acid, zinc 7/21/2024 at am

## 2024-07-22 NOTE — ED TRIAGE NOTES
Pt c/o syncopal episode in the shower 1 hour PTA. Pt reports she hit her head and had LOC for 1 minute. Pt has had her period x4 weeks and reports she has been bleeding heavily. Pt has also been experiencing lightheadedness over the past few weeks.      Triage Assessment (Adult)       Row Name 07/22/24 0633          Triage Assessment    Airway WDL WDL        Respiratory WDL    Respiratory WDL WDL        Cardiac WDL    Cardiac WDL WDL        Cognitive/Neuro/Behavioral WDL    Cognitive/Neuro/Behavioral WDL WDL

## 2024-07-22 NOTE — PROGRESS NOTES
RECEIVING UNIT ED HANDOFF REVIEW    ED Nurse Handoff Report was reviewed by: Deonna Leblanc RN on July 22, 2024 at 6:02 PM

## 2024-07-23 VITALS
DIASTOLIC BLOOD PRESSURE: 79 MMHG | HEIGHT: 60 IN | HEART RATE: 67 BPM | WEIGHT: 153.9 LBS | RESPIRATION RATE: 16 BRPM | TEMPERATURE: 98.1 F | OXYGEN SATURATION: 100 % | BODY MASS INDEX: 30.22 KG/M2 | SYSTOLIC BLOOD PRESSURE: 138 MMHG

## 2024-07-23 PROBLEM — E03.9 HYPOTHYROIDISM, UNSPECIFIED TYPE: Status: ACTIVE | Noted: 2024-07-23

## 2024-07-23 LAB
ANION GAP SERPL CALCULATED.3IONS-SCNC: 9 MMOL/L (ref 7–15)
BUN SERPL-MCNC: 9.5 MG/DL (ref 6–20)
CALCIUM SERPL-MCNC: 8.6 MG/DL (ref 8.8–10.4)
CHLORIDE SERPL-SCNC: 105 MMOL/L (ref 98–107)
CREAT SERPL-MCNC: 0.72 MG/DL (ref 0.51–0.95)
EGFRCR SERPLBLD CKD-EPI 2021: >90 ML/MIN/1.73M2
ERYTHROCYTE [DISTWIDTH] IN BLOOD BY AUTOMATED COUNT: 26.7 % (ref 10–15)
GLUCOSE SERPL-MCNC: 98 MG/DL (ref 70–99)
HCO3 SERPL-SCNC: 23 MMOL/L (ref 22–29)
HCT VFR BLD AUTO: 25.7 % (ref 35–47)
HGB BLD-MCNC: 7.1 G/DL (ref 11.7–15.7)
HGB BLD-MCNC: 7.8 G/DL (ref 11.7–15.7)
HPV HR 12 DNA CVX QL NAA+PROBE: NEGATIVE
HPV16 DNA CVX QL NAA+PROBE: NEGATIVE
HPV18 DNA CVX QL NAA+PROBE: NEGATIVE
HUMAN PAPILLOMA VIRUS FINAL DIAGNOSIS: NORMAL
MAGNESIUM SERPL-MCNC: 1.9 MG/DL (ref 1.7–2.3)
MAGNESIUM SERPL-MCNC: 2 MG/DL (ref 1.7–2.3)
MCH RBC QN AUTO: 22.7 PG (ref 26.5–33)
MCHC RBC AUTO-ENTMCNC: 30.4 G/DL (ref 31.5–36.5)
MCV RBC AUTO: 75 FL (ref 78–100)
PATH REPORT.COMMENTS IMP SPEC: NORMAL
PATH REPORT.COMMENTS IMP SPEC: NORMAL
PATH REPORT.FINAL DX SPEC: NORMAL
PATH REPORT.GROSS SPEC: NORMAL
PATH REPORT.MICROSCOPIC SPEC OTHER STN: NORMAL
PATH REPORT.RELEVANT HX SPEC: NORMAL
PHOSPHATE SERPL-MCNC: 3.4 MG/DL (ref 2.5–4.5)
PHOSPHATE SERPL-MCNC: 3.5 MG/DL (ref 2.5–4.5)
PHOTO IMAGE: NORMAL
PLATELET # BLD AUTO: 347 10E3/UL (ref 150–450)
POTASSIUM SERPL-SCNC: 4.1 MMOL/L (ref 3.4–5.3)
POTASSIUM SERPL-SCNC: 4.1 MMOL/L (ref 3.4–5.3)
RBC # BLD AUTO: 3.44 10E6/UL (ref 3.8–5.2)
SODIUM SERPL-SCNC: 137 MMOL/L (ref 135–145)
WBC # BLD AUTO: 4.6 10E3/UL (ref 4–11)

## 2024-07-23 PROCEDURE — 99239 HOSP IP/OBS DSCHRG MGMT >30: CPT | Performed by: INTERNAL MEDICINE

## 2024-07-23 PROCEDURE — 84100 ASSAY OF PHOSPHORUS: CPT | Performed by: INTERNAL MEDICINE

## 2024-07-23 PROCEDURE — 99254 IP/OBS CNSLTJ NEW/EST MOD 60: CPT | Performed by: NURSE PRACTITIONER

## 2024-07-23 PROCEDURE — 80048 BASIC METABOLIC PNL TOTAL CA: CPT | Performed by: STUDENT IN AN ORGANIZED HEALTH CARE EDUCATION/TRAINING PROGRAM

## 2024-07-23 PROCEDURE — 84132 ASSAY OF SERUM POTASSIUM: CPT | Performed by: INTERNAL MEDICINE

## 2024-07-23 PROCEDURE — 36415 COLL VENOUS BLD VENIPUNCTURE: CPT | Performed by: STUDENT IN AN ORGANIZED HEALTH CARE EDUCATION/TRAINING PROGRAM

## 2024-07-23 PROCEDURE — 83735 ASSAY OF MAGNESIUM: CPT | Performed by: INTERNAL MEDICINE

## 2024-07-23 PROCEDURE — 250N000013 HC RX MED GY IP 250 OP 250 PS 637: Performed by: STUDENT IN AN ORGANIZED HEALTH CARE EDUCATION/TRAINING PROGRAM

## 2024-07-23 PROCEDURE — 85018 HEMOGLOBIN: CPT | Performed by: INTERNAL MEDICINE

## 2024-07-23 PROCEDURE — 85018 HEMOGLOBIN: CPT | Performed by: STUDENT IN AN ORGANIZED HEALTH CARE EDUCATION/TRAINING PROGRAM

## 2024-07-23 PROCEDURE — 36415 COLL VENOUS BLD VENIPUNCTURE: CPT | Performed by: INTERNAL MEDICINE

## 2024-07-23 PROCEDURE — G0378 HOSPITAL OBSERVATION PER HR: HCPCS

## 2024-07-23 RX ORDER — NORGESTIMATE AND ETHINYL ESTRADIOL 0.25-0.035
1 KIT ORAL DAILY
Status: DISCONTINUED | OUTPATIENT
Start: 2024-07-23 | End: 2024-07-23

## 2024-07-23 RX ORDER — LEVOTHYROXINE SODIUM 100 UG/1
100 TABLET ORAL
Qty: 30 TABLET | Refills: 0 | Status: SHIPPED | OUTPATIENT
Start: 2024-07-24

## 2024-07-23 RX ORDER — NORGESTIMATE AND ETHINYL ESTRADIOL 0.25-0.035
1 KIT ORAL DAILY
Qty: 84 TABLET | Refills: 0 | Status: ON HOLD | OUTPATIENT
Start: 2024-07-23 | End: 2024-08-26

## 2024-07-23 RX ORDER — ACETAMINOPHEN 325 MG/1
650 TABLET ORAL EVERY 4 HOURS PRN
Status: ON HOLD | COMMUNITY
Start: 2024-07-23 | End: 2024-08-26

## 2024-07-23 RX ADMIN — LEVOTHYROXINE SODIUM 100 MCG: 100 TABLET ORAL at 07:03

## 2024-07-23 RX ADMIN — NORGESTIMATE AND ETHINYL ESTRADIOL 1 TABLET: KIT at 12:38

## 2024-07-23 ASSESSMENT — ACTIVITIES OF DAILY LIVING (ADL)
ADLS_ACUITY_SCORE: 25
DEPENDENT_IADLS:: INDEPENDENT
ADLS_ACUITY_SCORE: 25
ADLS_ACUITY_SCORE: 25

## 2024-07-23 NOTE — PLAN OF CARE
Orientation: A&Ox4  Aggression Stop Light: Green  Activity: SBA  Diet/BS Checks: Regular  Tele:  NSR  IV Access/Drains: RPIV CDI infusing # 4 unit of blood  Pain Management: Prn tylenol given for abd cramping  Abnormal VS/Results: VSS on RA. Hgb came back 6.6, another unit of blood given   Bowel/Bladder: Continent of bowel and bladder. Pt having vaginal bleeding with clots. Has had x1 pad change since admission to the floor   Skin/Wounds: Red bump to left shoulder from fall. Pt declined full skin assessment   Consults: Oncology   D/C Disposition: Pending

## 2024-07-23 NOTE — PLAN OF CARE
7/22/2024 2300 - 7/23/2024 0730    Orientation: A&Ox4; very pleasant  Aggression Stop Light: Green  Activity: SBA; steady  Diet/BS Checks: Regular; tolerating without problems  Tele:  NSR  IV Access/Drains: PIV SL  Pain Management: denied pain this shift  Abnormal VS/Results: VSS; RA. Hgb: 7.1 and 7.8 this shift  Bowel/Bladder: Continent of bowel and bladder; no BM this shift  Skin/Wounds: Red bump to left shoulder; skin intact  Consults: Oncology   D/C Disposition: Pending progress/plan  Other info:    - vaginal bleeding continues, but has decreased per patient report and no clots seen this shift   - Mag, phos, potassium protocols

## 2024-07-23 NOTE — PROGRESS NOTES
"Gynecology Oncology brief progress note  7/23/24  -patient seen and evaluated with Lovely Coreas MD. Please see Dr. Coreas's note for full details    HD #2 for abnormal uterine bleeding, blood loss anemia    SUBJECTIVE:  Interval history:  Generally feeling well overnight. Had some pelvic cramping this morning with urination, which is normal for her, but was not severe. Did saturate a pad and had passed a clot after her endometrial biopsy yesterday- since then, has had scant bleeding. Tolerating OCP without incident. Feeling less cold, less LOPEZ, and less fatigued since PRBC transfusion. She expresses interest in doing whatever it takes to stop the persistent pelvic pain and bleeding, including hysterectomy. Denies nausea, vomiting, urinary concerns. LBM yesterday. Eating and drinking well.    OBJECTIVE:  Physical exam:  /79 (BP Location: Left arm)   Pulse 67   Temp 98.1  F (36.7  C) (Oral)   Resp 16   Ht 1.511 m (4' 11.5\")   Wt 69.8 kg (153 lb 14.4 oz)   SpO2 100%   BMI 30.56 kg/m    CONSTITUTIONAL: Alert non-toxic appearing female in no acute distress  RESPIRATORY: Lungs clear to auscultation, respirations unlabored  CV: Regular rate and rhythm, S1S2, no clicks, murmurs, rubs, or gallops  GASTROINTESTINAL: Normoactive bowel sounds, abdomen soft, non-distended; tender to palpation over the uterine fundus at the LLQ but no guarding, rebound tenderness, or rigidity  MUSCULOSKELETAL: Moves all extremities, no obvious muscle wasting  NEUROLOGIC: No gross deficits  SKIN: Appropriate color for race, warm and dry, no rashes or lesions to unclothed skin  PSYCHIATRIC: Pleasant and interactive, affect bright, makes appropriate eye contact, thought process linear    Recent Results (from the past 24 hour(s))   Hemoglobin    Collection Time: 07/22/24  3:20 PM   Result Value Ref Range    Hemoglobin 5.5 (LL) 11.7 - 15.7 g/dL   Echocardiogram Complete    Collection Time: 07/22/24  3:40 PM   Result Value Ref Range    LVEF  " 55-60%    CONDITIONAL Prepare red blood cells (unit)    Collection Time: 07/22/24  4:29 PM   Result Value Ref Range    Blood Component Type Red Blood Cells     Product Code [id number]     Unit Status Transfused     Unit Number [id number]     CROSSMATCH Compatible     CODING SYSTEM [id number]     ISSUE DATE AND TIME [id number]     UNIT ABO/RH O+     UNIT TYPE ISBT 5100    Hemoglobin    Collection Time: 07/22/24  7:51 PM   Result Value Ref Range    Hemoglobin 6.6 (LL) 11.7 - 15.7 g/dL   CONDITIONAL Prepare red blood cells (unit)    Collection Time: 07/22/24  8:32 PM   Result Value Ref Range    Blood Component Type Red Blood Cells     Product Code C3013M67     Unit Status Transfused     Unit Number [id number]     CROSSMATCH Compatible     CODING SYSTEM [id number]     ISSUE DATE AND TIME [id number]     UNIT ABO/RH O+     UNIT TYPE ISBT 5100    Hemoglobin    Collection Time: 07/23/24  2:41 AM   Result Value Ref Range    Hemoglobin 7.1 (L) 11.7 - 15.7 g/dL   Magnesium    Collection Time: 07/23/24  2:41 AM   Result Value Ref Range    Magnesium 1.9 1.7 - 2.3 mg/dL   Phosphorus    Collection Time: 07/23/24  2:41 AM   Result Value Ref Range    Phosphorus 3.4 2.5 - 4.5 mg/dL   Basic metabolic panel    Collection Time: 07/23/24  6:43 AM   Result Value Ref Range    Sodium 137 135 - 145 mmol/L    Potassium 4.1 3.4 - 5.3 mmol/L    Chloride 105 98 - 107 mmol/L    Carbon Dioxide (CO2) 23 22 - 29 mmol/L    Anion Gap 9 7 - 15 mmol/L    Urea Nitrogen 9.5 6.0 - 20.0 mg/dL    Creatinine 0.72 0.51 - 0.95 mg/dL    GFR Estimate >90 >60 mL/min/1.73m2    Calcium 8.6 (L) 8.8 - 10.4 mg/dL    Glucose 98 70 - 99 mg/dL   CBC with platelets    Collection Time: 07/23/24  6:43 AM   Result Value Ref Range    WBC Count 4.6 4.0 - 11.0 10e3/uL    RBC Count 3.44 (L) 3.80 - 5.20 10e6/uL    Hemoglobin 7.8 (L) 11.7 - 15.7 g/dL    Hematocrit 25.7 (L) 35.0 - 47.0 %    MCV 75 (L) 78 - 100 fL    MCH 22.7 (L) 26.5 - 33.0 pg    MCHC 30.4 (L) 31.5 - 36.5 g/dL     RDW 26.7 (H) 10.0 - 15.0 %    Platelet Count 347 150 - 450 10e3/uL   Magnesium    Collection Time: 24  6:43 AM   Result Value Ref Range    Magnesium 2.0 1.7 - 2.3 mg/dL   Phosphorus    Collection Time: 24  6:43 AM   Result Value Ref Range    Phosphorus 3.5 2.5 - 4.5 mg/dL   Potassium    Collection Time: 24  6:43 AM   Result Value Ref Range    Potassium 4.1 3.4 - 5.3 mmol/L         ASSESSMENT&PLAN:   Aleida Hagan is a 50 yo  with h/o hypothyroidism, endometriosis and menorrhagia who presented after an episode of LOC. This occurred in the setting of 3 weeks of heavy vaginal bleeding. On admission she was found to be severely anemic with a hgb of 3.5. Pelvic US was obtained and showed an enlarged uterus c/w adenomyosis vs fibroid without good visualization of the endometrium. She has received 4 units of pRBCs with appropriate rise in her hgb to 7.8 as of this morning. Differential diagnosis for her AUB includes most likely structural lesions (submucosal fibroid, endometrial polyp, adenomyosis) vs malignancy given appearance of uterus on US. Pt also noted to have hypothyroidism with elevated TSH (>17) and low T4 on admission which could contribute to AUB as well. Endometrial biopsy obtained on day of admission to r/o malignancy as cause in order to determine appropriate next steps. If results c/w carcinoma, recommendation would be for surgical management with hysterectomy/staging, if benign pathology, can discuss option of surgical management vs alternatives depending on histologic dx.   - primary management per medicine team, appreciate excellent cares  - would recommend continuation combined OCPs, take in a continuous fashion (skip placebo week)   - f/up endometrial biopsy and pap (both obtained )  - management of ABLA per primary team, agree with transfusion for  hgb>7  - management of hypothyroidism per primary medicine team; initiated synthroid 100 mcg today  - will discuss  management options pending endometrial biopsy results. Briefly discussed potential option for hysterectomy as patient expressed interest. Reviewed that this could be appropriate, but we will need to see pathology results before discussion of further management.      Disposition: pending clinical course, EBMx results and subsequent management decisions     DIONICIO Elizabeth CNP on 7/23/2024 at 9:09 AM

## 2024-07-23 NOTE — PROGRESS NOTES
Brief Gyn/Onc Note    Endometrial biopsy from 7/22 resulted with report as follows:     -Fragments of endometrium and endometrial polyp with early secretory changes, acute inflammation and hyalinization; negative for hyperplasia, atypia and malignancy.  -Fragments of unremarkable endocervix    Discussed these results with the patient. Will plan for outpatient follow up for further discussion of long-term management. Would recommend pt stays on continuous OCPs until follow up and discussed this with her this morning. Pt expresses interest in definitive management with hysterectomy. Bleeding today continues but much lighter than the past 3 weeks, now darker red in color. Symptoms of ABLA are drastically improved after transfusion of 4u pRBC per her report and hgb much improved at 7.8 (from 3.5) this morning. Bleeding/symptomatic return precautions discussed. Remainder of care per pts primary medicine team, appreciate their excellent care of her here in the hospital.     Above results discussed with gyn/onc staff Dr. Dunne.    Lovely Coreas MD  OB/GYN PGY-3  7/23/2024 11:29 AM

## 2024-07-23 NOTE — DISCHARGE SUMMARY
[facility]    Discharge Summary  Hospitalist    Date of Admission:  7/22/2024  Date of Discharge:  7/23/2024  1:42 PM  Provider:  Martha Urena,     Discharge Diagnoses    Abnormal uterine bleeding   Menorrhagia   Acute blood loss anemia, symptomatic  Syncopal episode   Hypothyroidism, new diagnosis    Other medical issues:  No past medical history on file.    History of Present Illness   Aleida Hagan is an 49 year old female who presented with weakness and near syncope in the setting of several weeks of progressive heavy uterine bleeding.  Please see the admission history and physical for full details.    Hospital Course   Aleida Hagan was admitted on 7/22/2024.  The following problems were addressed during her hospitalization:     Abnormal uterine bleeding          Menorrhagia         Syncopal episode    48 yo female who presents to the ED with several weeks of progressive weakness, SOB with exertion in the setting of menorrhagia who presents for medical evaluation after an episode of LOC.  Noted to have severe anemia (hgb initially at 3.5).      Pelvic US with evidence of enlarged uterus without good visualization of the endometrium.  Gyn consulted; underwent an endometrial biopsy and pap smear on 7/22/24; path results returned with benign findings.     She was started on OCPs.  Vaginal bleeding persisted but was much less on day of discharge then had been weeks prior.  Plan to take OCPs prescribed in a continuous fashion (skip the placebo pills) until clinic f/up with GYN in the next several weeks.     She did receive 4 units total of PRBCs with improvement in both her hgb (7.8) and her symptoms on day of discharge.  She will have hospital f/up with PCP with a preclinical CBC in the next 7-10 days.      2.  Hypothyroidism    Noted to have elevated TSH and low free T4 on admission blood work.  Started on daily synthroid tablet; will need close f/up with PCP for symptom  recheck and well as lab recheck (TSH, free T4) in the next 4-6 weeks.     It was recommended that she stop taking one of her supplements, lion's merlyn, for now until her thyroid can be regulated.          Significant Results and Procedures   Endometrial biopsy 7/22/24    Pending Results   Unresulted Labs Ordered in the Past 30 Days of this Admission       Date and Time Order Name Status Description    7/23/2024  8:24 AM Gynecologic Cytology (PAP) In process             Code Status   Full Code       Primary Care Physician   Sig[facility]    Physical Exam   Temp: 98.1  F (36.7  C) Temp src: Oral BP: 138/79 Pulse: 67   Resp: 16 SpO2: 100 % O2 Device: None (Room air)    Vitals:    07/22/24 1658 07/23/24 0619   Weight: 68 kg (150 lb) 69.8 kg (153 lb 14.4 oz)     Vital Signs with Ranges  Temp:  [97.9  F (36.6  C)-98.7  F (37.1  C)] 98.1  F (36.7  C)  Pulse:  [66-79] 67  Resp:  [10-17] 16  BP: (115-138)/(62-96) 138/79  SpO2:  [91 %-100 %] 100 %  I/O last 3 completed shifts:  In: 1260 [P.O.:360]  Out: 1100 [Urine:1100]    GEN:  Alert, oriented x 3, comfortable, NAD.    HEENT:  Normocephalic/atraumatic, PERRL, no scleral icterus, no nasal discharge, mouth moist  CV:  Regular rate and rhythm, no murmur to ausc.  S1 + S2 noted, no S3 or S4.  LUNGS:  Clear to auscultation ant/lat bilaterally.  No rales/rhonchi/wheezing auscultated bilaterally.  No costal retractions bilaterally.   EXT:  No pretibial edema or cyanosis bilaterally.   SKIN:  Dry to touch, no rashes or jaundice noted.  PSYCH:  Mood appropriate, Not tearful or depressed.  Maintains direct eye contact.  NEURO:  No tremors at rest, speech is clear and appropriate.    Discharge Disposition   Discharged to home    Consultations This Hospital Stay   GYNECOLOGY IP CONSULT  CARE MANAGEMENT / SOCIAL WORK IP CONSULT    Time Spent on this Encounter   IMartha DO, personally saw the patient today and spent greater than 30 minutes discharging this  patient.    Discharge Orders      Reason for your hospital stay    You were admitted for abnormal uterine bleeding     Follow-up and recommended labs and tests     F/up with gyn onc in the next 2 weeks (clinic will call to schedule)  Establish with PCP within then next 7-10 days with preclinical hgb  Will need TSH, free T4 levels in the next 4-6 wks     Activity    Your activity upon discharge: as tolerated; off work until 7/29/24     Diet    Follow this diet upon discharge: Regular Diet Adult     Discharge Medications   Discharge Medication List as of 7/23/2024 12:12 PM        START taking these medications    Details   acetaminophen (TYLENOL) 325 MG tablet Take 2 tablets (650 mg) by mouth every 4 hours as needed for mild pain or other (and adjunct with moderate or severe pain or per patient request), OTC      levothyroxine (SYNTHROID/LEVOTHROID) 100 MCG tablet Take 1 tablet (100 mcg) by mouth every morning (before breakfast), Disp-30 tablet, R-0, E-Prescribe      norgestimate-ethinyl estradiol (ORTHO-CYCLEN) 0.25-35 MG-MCG tablet Take 1 tablet by mouth daily, Disp-84 tablet, R-0, E-Prescribe           CONTINUE these medications which have NOT CHANGED    Details   MAGNESIUM GLYCINATE PO Take 2 mLs by mouth daily 600 mg glycinate  300 mg taurate  300 mg citrate, Historical      Pediatric Multivitamins-Iron (FRUITY CHEWS/IRON) CHEW Take 4-5 chew tab by mouth daily Ferrous fumarate 18 mg per 3 gummies  Also contains, Niacin, Vitamin B6, Folate, vitamin B12, pantothenic acid, zinc, Historical           STOP taking these medications       NONFORMULARY Comments:   Reason for Stopping:             Allergies   No Known Allergies  Data   Recent Labs   Lab 07/23/24  0643 07/23/24  0241 07/22/24  1951 07/22/24  1520 07/22/24  0654   WBC 4.6  --   --   --  7.0   HGB 7.8* 7.1* 6.6*   < > 3.5*   HCT 25.7*  --   --   --  13.6*   MCV 75*  --   --   --  63*     --   --   --  481*    < > = values in this interval not  "displayed.     7-Day Micro Results       No results found for the last 168 hours.          Recent Labs   Lab 07/23/24  0643 07/23/24  0241 07/22/24  1951   HGB 7.8* 7.1* 6.6*     No results for input(s): \"INR\" in the last 168 hours.  No results for input(s): \"COLOR\", \"APPEARANCE\", \"URINEGLC\", \"URINEBILI\", \"URINEKETONE\", \"SG\", \"UBLD\", \"URINEPH\", \"PROTEIN\", \"UROBILINOGEN\", \"NITRITE\", \"LEUKEST\", \"RBCU\", \"WBCU\" in the last 168 hours.  Results for orders placed or performed during the hospital encounter of 07/22/24   CT Head w/o Contrast    Narrative    CT SCAN OF THE HEAD WITHOUT CONTRAST   7/22/2024 9:22 AM     HISTORY: Fall, syncope.    TECHNIQUE:  Axial images of the head and coronal reformations without  IV contrast material. Radiation dose for this scan was reduced using  automated exposure control, adjustment of the mA and/or kV according  to patient size, or iterative reconstruction technique.    COMPARISON: None.    FINDINGS: There is no evidence of intracranial hemorrhage, mass, acute  infarct or anomaly. The ventricles are normal in size, shape and  configuration. The brain parenchyma and subarachnoid spaces are  normal.     The visualized portions of the sinuses and mastoids appear normal. The  bony calvarium and bones of the skull base appear intact.       Impression    IMPRESSION:  No evidence of acute intracranial hemorrhage, mass, or  herniation.     JO-ANN GRAJEDA MD         SYSTEM ID:  [id number]   US Pelvis Cmplt w Transvag & Doppler LmtPel Duplex Limited    Narrative    US PELVIS COMPLETE W TRANSVAGINAL AND DOPPLER LIMITED 7/22/2024 8:11  AM    CLINICAL HISTORY: Pelvic pain  TECHNIQUE: Transabdominal scans were performed. Endovaginal ultrasound  was performed to better visualize the adnexa. Color and spectral  Doppler images of both ovaries were also performed.    COMPARISON: None.    FINDINGS:    UTERUS: 12.4 x 10.5 x 10.3 cm. Enlarged in normal position. The  uterine body and fundus is obscured with " questionable associated  fibroid measuring up to 9.3 cm. Multiple nabothian cysts in the  cervix.    ENDOMETRIUM: Not well visualized.    RIGHT OVARY: 5.5 x 3.8 x 2.2 cm. Cystic lesion in the right ovary  measuring up to 4.6 cm. While this is primarily simple-appearing,  there may be a small peripheral calcified nodule. Normal arterial and  venous waveforms are identified in the peripheral ovarian parenchyma.    LEFT OVARY: Not visualized, likely obscured by overlying bowel gas.    No significant free fluid.      Impression    IMPRESSION:  1.  Enlarged, heterogeneous uterus, could be replaced by a large  fibroid versus adenomyoma.  2.  Endometrium is not well visualized, most likely obscured by #1 but  could consider further evaluation with MRI to include underlying mass  or lesion, if clinically indicated on a nonemergent/outpatient basis.  3.  Relatively simple appearing right ovarian cystic lesion with  possible small peripheral calcification, consider attention on  follow-up imaging. No evidence of right ovarian torsion  4.  Nonvisualized left ovary.    ROSA HSARMA MD         SYSTEM ID:  [id number]   Echocardiogram Complete     Value    LVEF  55-60%    Narrative    [id number]  [id number]  [id number]  [id number]^[email or web address]^[email or web address]^A     [facility]  Echocardiography Laboratory  [address] [place] [address]     Name: NAYELI HASTINGS  MRN: [id number]  : 1975  Study Date: 2024 02:45 PM  Age: 49 yrs  Gender: Female  Patient Location: [facility]  Reason For Study: Syncope  Ordering Physician: SHIREEN SHELBY  Referring Physician: [facility]  Performed By: Will Bone RDCS     BSA: 1.8 m2  Height: 69 in  Weight: 150 lb  HR: 71  BP: 133/78 mmHg  ______________________________________________________________________________  Procedure  Complete Portable Echo Adult.  ______________________________________________________________________________  Interpretation Summary      The visual ejection fraction is 55-60%.  Left ventricular systolic function is normal.  There is trace aortic regurgitation.  ______________________________________________________________________________  Left Ventricle  The left ventricle is normal in size. There is normal left ventricular wall  thickness. Left ventricular systolic function is normal. The visual ejection  fraction is 55-60%. Diastolic Doppler findings (E/E' ratio and/or other  parameters) suggest left ventricular filling pressures are normal.     Right Ventricle  The right ventricle is normal in size and function.     Atria  Normal left atrial size. Right atrial size is normal. There is no color  Doppler evidence of an atrial shunt.     Mitral Valve  There is mild (1+) mitral regurgitation.     Tricuspid Valve  There is mild (1+) tricuspid regurgitation. The right ventricular systolic  pressure is approximated at 27.7 mmHg plus the right atrial pressure.     Aortic Valve  The aortic valve is trileaflet. There is trace aortic regurgitation. No  hemodynamically significant valvular aortic stenosis.     Pulmonic Valve  There is no pulmonic valvular regurgitation. Normal pulmonic valve velocity.     Vessels  The aortic root is normal size. (3.2 cm). Normal size ascending aorta. IVC  diameter >2.1 cm collapsing <50% with sniff suggests a high RA pressure  estimated at 15 mmHg or greater. Dilation of the inferior vena cava is present  with abnormal respiratory variation in diameter.     Pericardium  There is no pericardial effusion.     Rhythm  Sinus rhythm was noted.  ______________________________________________________________________________  MMode/2D Measurements & Calculations  IVSd: 0.98 cm     LVIDd: 5.0 cm  LVIDs: 3.2 cm  LVPWd: 1.1 cm  FS: 34.5 %  LV mass(C)d: 193.0 grams  LV mass(C)dI: 105.6 grams/m2  LA dimension: 4.1 cm  asc Aorta Diam: 3.3 cm  Asc Ao diam index BSA (cm/m2): 1.8  Asc Ao diam index Ht(cm/m): 1.9  LA Volume (BP): 67.8  ml  LA Volume Index (BP): 37.0 ml/m2  RV Base: 3.8 cm     RWT: 0.46  TAPSE: 3.7 cm     Doppler Measurements & Calculations  MV E max morgan: 88.4 cm/sec  MV A max morgan: 77.9 cm/sec  MV E/A: 1.1  MV dec slope: 528.9 cm/sec2  MV dec time: 0.17 sec  PA acc time: 0.21 sec  TR max morgan: 263.1 cm/sec  TR max P.7 mmHg  E/E' av.0  Lateral E/e': 8.0  Medial E/e': 8.0  RV S Morgan: 14.1 cm/sec     ______________________________________________________________________________  Report approved by: Emily Cueto 2024 04:01 PM

## 2024-07-23 NOTE — PROGRESS NOTES
Discharge Note    Patient discharged to home via private vehicle  accompanied by daughter.  IV: Discontinued  Prescriptions filled and given to patient/family.   Belongings reviewed and sent with patient.   Home medications returned to patient: NA  Equipment sent with: patient, N/A.   patient verbalizes understanding of discharge instructions. AVS given to patient.

## 2024-07-23 NOTE — PROVIDER NOTIFICATION
MD Notification    Notified Person: MD    Notified Person Name: Dr. Patiño    Notification Date/Time: 07/22/24 at 2019    Notification Interaction: Vocera message    Purpose of Notification: FYI - This patient's hemoglobin came back critical at 6.6. She has conditional orders for PRBC's, so we will proceed with that as ordered.  This will be her 4th unit of PRBC's today. I see she has a CBC ordered for the AM, would you like to order any additional blood draws?     Orders Received: Check hemoglobin 1-2 hours after PRBC transfusion; conditional order placed by MD to release when needed.    Comments:

## 2024-07-23 NOTE — PROGRESS NOTES
GYN ONC PROGRESS NOTE  07/23/24    HD#2  Disease: AUB    24 hour events:   - admit  - hgb 3.5> 4u pRBC> 7.8  - high TSH, low free T4> synthroid started  - US: enlarged, 12 cm [place] c/f fibroid vs adeno. S/p tubal ligation. R ovarian simple 4.6 cm cyst  - CT head: neg acute pathology following fall with syncopal event     SUBJECTIVE:   Patient is feeling much better this morning.  Had significant cramping abdominal pain after biopsy yesterday which improved with oxycodone. Returned a bit last night but felt like typical menstrual cramping to her, which she states she is very used to. This morning she does not have any notable pain.  Eating and drinking without nausea and vomiting.  Voiding spontaneously. Bleeding picked up after the biopsy yesterday and she soaked 1 pad yesterday afternoon/evening. Afterwards she changed her pad x1 and she did not soak a pad overnight; reports this is the first time she has not been up all night with bleeding in weeks. Did notice some small clots when up to the bathroom this morning.     OBJECTIVE:   Patient Vitals for the past 24 hrs:   BP Temp Temp src Pulse Resp SpO2 Weight   07/22/24 1956 137/83 98.2  F (36.8  C) Oral 73 16 91 % --   07/22/24 1820 (!) 125/96 98.4  F (36.9  C) Oral 74 16 97 % --   07/22/24 1801 -- -- -- 71 17 100 % --   07/22/24 1800 (!) 134/91 -- -- 66 11 -- --   07/22/24 1710 -- 97.9  F (36.6  C) Oral 75 15 98 % --   07/22/24 1701 -- -- -- 68 13 95 % --   07/22/24 1700 131/78 -- -- 73 10 -- --   07/22/24 1658 -- -- -- -- -- -- 68 kg (150 lb)   07/22/24 1655 133/83 97.9  F (36.6  C) Oral 74 16 -- --   07/22/24 1610 -- -- -- 70 10 96 % --   07/22/24 1609 133/83 -- -- 73 -- -- --   07/22/24 1517 -- -- -- 70 18 99 % --   07/22/24 1516 126/80 -- -- 70 12 -- --   07/22/24 1315 -- -- -- 80 26 100 % --   07/22/24 1300 133/78 -- -- 71 16 98 % --   07/22/24 1245 -- -- -- 71 12 100 % --   07/22/24 1230 131/84 -- -- 67 10 99 % --   07/22/24 1217 -- -- -- 73 16 100 % --    24 1202 139/71 -- -- 87 12 -- --   24 1132 120/78 -- -- 65 16 96 % --   24 1117 (!) 144/74 -- -- 72 13 -- --   24 1114 120/63 98  F (36.7  C) -- 72 16 94 % --   24 1106 136/69 97.8  F (36.6  C) -- 68 18 -- --   24 1030 122/69 -- -- 75 10 97 % --   24 1004 122/80 97.9  F (36.6  C) -- 85 16 93 % --   24 1000 122/80 -- -- 78 -- 96 % --   24 0934 117/63 97.9  F (36.6  C) Oral 69 18 93 % --   24 0930 117/63 -- -- 71 13 95 % --   24 0916 111/68 97.9  F (36.6  C) Oral 71 18 97 % --   24 0904 115/70 97.7  F (36.5  C) Oral 67 18 95 % --   24 0847 116/69 97.9  F (36.6  C) Oral 71 18 94 % --   24 0845 116/69 -- -- 66 11 98 % --   24 0828 107/63 97.9  F (36.6  C) -- 72 18 -- --   24 -- -- -- 74 18 94 % --   24 0715 -- -- -- 70 10 94 % --   24 0712 -- -- -- 71 14 92 % --   24 0702 108/50 -- -- 74 26 -- --   24 0635 100/45 97.8  F (36.6  C) Oral 83 14 99 % --     Gen- Laying in bed, NAD  CV- regular rate, warm and well perfused   Pulm- breathing comfortably on room air   Abd- soft, non-distended, tender to deep palpation LLQ over uterine fundus   Extr- no edema, nontender  Lines/drains- PIV    New Labs/Imaging-    Latest Reference Range & Units 24 06:54 24 15:20 24 19:51 24 02:41 24 06:43   Hemoglobin 11.7 - 15.7 g/dL 3.5 (LL) 5.5 (LL) 6.6 (LL) 7.1 (L) 7.8 (L)   (LL): Data is critically low  (L): Data is abnormally low    ASSESSMENT&PLAN:   Aleida Hagan is a 50 yo  with h/o hypothyroidism, endometriosis and menorrhagia who presented after an episode of LOC. This occurred in the setting of 3 weeks of heavy vaginal bleeding. On admission she was found to be severely anemic with a hgb of 3.5. Pelvic US was obtained and showed an enlarged uterus c/w adenomyosis vs fibroid without good visualization of the endometrium. She has received 4 units of pRBCs with appropriate  rise in her hgb to 7.8 as of this morning. Differential diagnosis for her AUB includes most likely structural lesions (submucosal fibroid, endometrial polyp, adenomyosis) vs malignancy given appearance of uterus on US. Pt also noted to have hypothyroidism with elevated TSH (>17) and low T4 on admission which could contribute to AUB as well. Endometrial biopsy obtained on day of admission to r/o malignancy as cause in order to determine appropriate next steps. If results c/w carcinoma, recommendation would be for surgical management with hysterectomy, if benign pathology, can discuss option of surgical management vs alternatives depending on histologic dx.   - primary management per medicine team, appreciate excellent cares  - would recommend continuation combined OCPs, take in a continuous fashion (skip placebo week)   - f/up endometrial biopsy and pap (both obtained 7/22)  - management of ABLA per primary team, agree with transfusion for  hgb>7  - management of hypothyroidism per primary medicine team; initiated synthroid 100 mcg today  - will discuss management options pending endometrial biopsy results     Disposition: pending clinical course, EBMx results and subsequent management decisions     Discussed with NISHA Elizabeth MD  OB/GYN PGY-3  7/23/2024 7:03 AM     Patient seen and evaluated with Dr. Coreas. Please see my additional note for my personal assessment, plan, and medical decision making.  DIONICIO Elizabeth CNP on 7/23/2024 at 8:58 AM

## 2024-07-23 NOTE — PROGRESS NOTES
Care Management Discharge Note    Discharge Date: 07/24/2024       Discharge Disposition:  Home    Discharge Services:  None    Discharge DME:  None    Discharge Transportation: family or friend will provide    Private pay costs discussed: Not applicable    Does the patient's insurance plan have a 3 day qualifying hospital stay waiver?  No    PAS Confirmation Code:    Patient/family educated on Medicare website which has current facility and service quality ratings:      Education Provided on the Discharge Plan:    Persons Notified of Discharge Plans: patient  Patient/Family in Agreement with the Plan: yes    Handoff Referral Completed: No    Additional Information:  Patient discharging today.    She would like to establish care with a  clinic, either [place] or [place].  An appointment for hospital follow up and to establish care was made for Thursday August 1 at 2:40 pm with provider Bindu Rodgers at [facility].      She is to follow up with GYN/Onc in the next 2 weeks and the clinic will call her to schedule.    Her daughter is here to provide transportation home.    Shauna Castañeda RN  Inpatient Float Care Coordinator  [facility]

## 2024-07-23 NOTE — CONSULTS
Care Management Initial Consult    General Information  Assessment completed with: Patient,    Type of CM/SW Visit: Initial Assessment    Primary Care Provider verified and updated as needed: No (no pcp; needs to establish care)   Readmission within the last 30 days: no previous admission in last 30 days      Reason for Consult: discharge planning, financial concerns  Advance Care Planning: Advance Care Planning Reviewed: no concerns identified          Communication Assessment  Patient's communication style: spoken language (English or Bilingual)    Hearing Difficulty or Deaf: no   Wear Glasses or Blind: yes    Cognitive  Cognitive/Neuro/Behavioral: WDL                      Living Environment:   People in home: child(jacinto), adult  daughter  Current living Arrangements: apartment      Able to return to prior arrangements: yes       Family/Social Support:  Care provided by: self  Provides care for: no one  Marital Status:              Description of Support System:           Current Resources:   Patient receiving home care services: No     Community Resources: None  Equipment currently used at home: none  Supplies currently used at home:      Employment/Financial:  Employment Status: employed full-time        Financial Concerns: insurance, none   Referral to Financial Worker: Yes  Finance Comments: Financial counseling has reached out to patient and sending her application    Does the patient's insurance plan have a 3 day qualifying hospital stay waiver?  No    Lifestyle & Psychosocial Needs:  Social Determinants of Health     Food Insecurity: Not on file   Depression: Not on file   Housing Stability: Not on file   Tobacco Use: Low Risk  (9/17/2021)    [facility]    Patient History     Smoking Tobacco Use: Never     Smokeless Tobacco Use: Never     Passive Exposure: Not on file   Financial Resource Strain: High Risk (1/1/2022)    Received from [employer]    Financial Resource Strain     Difficulty of Paying Living Expenses: Not on file     Difficulty of Paying Living Expenses: Not on file   Alcohol Use: Not on file   Transportation Needs: Not on file   Physical Activity: Not on file   Interpersonal Safety: Not on file   Stress: Not on file   Social Connections: Unknown (1/1/2022)    Received from [facility]    Social Connections     Frequency of Communication with Friends and Family: Not on file   Health Literacy: Not on file       Functional Status:  Prior to admission patient needed assistance:   Dependent ADLs:: Independent  Dependent IADLs:: Independent       Mental Health Status:          Chemical Dependency Status:                Values/Beliefs:  Spiritual, Cultural Beliefs, Jew Practices, Values that affect care:                 Additional Information:  Met with patient; explained role in discharge planning.      Patient admitted with vaginal bleed; was transfused and consulted by GYN;     Patient lives independently with her adult daughter.  She is a .  She had not seen a PCP since she lived in [facility] where she was followed by the [facility].  She would like to establish care at a  clinic.    She has no other needs.  Care management will call to make appointment.  She will be discharging today.    Shauna Castañeda RN  Inpatient Float Care Coordinator  [facility]

## 2024-07-26 LAB
BKR LAB AP GYN ADEQUACY: NORMAL
BKR LAB AP GYN INTERPRETATION: NORMAL
BKR LAB AP LMP: NORMAL
BKR LAB AP PREVIOUS ABNORMAL: NORMAL
PATH REPORT.COMMENTS IMP SPEC: NORMAL
PATH REPORT.COMMENTS IMP SPEC: NORMAL
PATH REPORT.RELEVANT HX SPEC: NORMAL

## 2024-08-02 ENCOUNTER — PATIENT OUTREACH (OUTPATIENT)
Dept: CARE COORDINATION | Facility: CLINIC | Age: 49
End: 2024-08-02

## 2024-08-02 DIAGNOSIS — Z71.89 COORDINATION OF COMPLEX CARE: Primary | ICD-10-CM

## 2024-08-02 NOTE — PROGRESS NOTES
Social Work - Telephone/MyChart message  [facility]  Data:   Patient Name: Aleida Hagan  Goes By: Aleida RATLIFFB/Age: 1975 (49 year old)      Referral Source: Dr. Dunne    Reason for Referral: No health insurance, needs surgery    Intervention: Left voice message for patient on 2024 . SW left detailed voicemail with social work contact information and [employer] line.    Plan:  will attempt outreach again next week to follow-up regarding resource support.   Ana Irby, WILEY, LICSW, OSW-C  Clinical - Adult Oncology  Phone: [phone]  She/Her/Hers  [facility]: Savi BEAN  8am-4:30pm  [facility]: JAMES Pérez F 8am-4:30pm   Support Groups at [facility]: Social Work Services for Cancer Patients ([email or web address])

## 2024-08-06 ENCOUNTER — PATIENT OUTREACH (OUTPATIENT)
Dept: CARE COORDINATION | Facility: CLINIC | Age: 49
End: 2024-08-06

## 2024-08-06 NOTE — PROGRESS NOTES
Social Work - Telephone/MyChart message  [facility]  Data:   Patient Name: Aleida Hagan  Goes By: Aleida BAKER/Age: 1975 (49 year old)      Reason for Referral: Follow-up regarding lack of health insurance    Intervention: Left voice message for patient on 2024 .     SW also sent mailing with Medicaid enrollment resources.   Plan:  will await patient's return phone call/message and provide assistance at that time.   WILEY Dangelo, LICSW, OSW-C  Clinical - Adult Oncology  Phone: [phone]  She/Her/Hers  [facility]: Savi BEAN  8am-4:30pm  [facility]: JAMES Pérez F 8am-4:30pm   Support Groups at [facility]: Social Work Services for Cancer Patients ([email or web address])    Addendum: SW received message from [facility] that they screened and deemed pt to be over-income for MA at present time.

## 2024-08-25 ENCOUNTER — APPOINTMENT (OUTPATIENT)
Dept: GENERAL RADIOLOGY | Facility: CLINIC | Age: 49
End: 2024-08-25

## 2024-08-25 ENCOUNTER — HOSPITAL ENCOUNTER (OUTPATIENT)
Facility: CLINIC | Age: 49
Setting detail: OBSERVATION
Discharge: HOME | End: 2024-08-26
Attending: EMERGENCY MEDICINE | Admitting: OBSTETRICS & GYNECOLOGY

## 2024-08-25 DIAGNOSIS — N92.1 MENOMETRORRHAGIA: ICD-10-CM

## 2024-08-25 DIAGNOSIS — D25.9 UTERINE LEIOMYOMA, UNSPECIFIED LOCATION: ICD-10-CM

## 2024-08-25 DIAGNOSIS — R10.2 PELVIC PAIN IN FEMALE: ICD-10-CM

## 2024-08-25 DIAGNOSIS — D62 ANEMIA DUE TO BLOOD LOSS, ACUTE: ICD-10-CM

## 2024-08-25 LAB
ABO/RH(D): NORMAL
ANION GAP SERPL CALCULATED.3IONS-SCNC: 16 MMOL/L (ref 7–15)
ANTIBODY SCREEN: NEGATIVE
ANTIBODY SCREEN: NEGATIVE
ATRIAL RATE - MUSE: 79 BPM
BASE EXCESS BLDV CALC-SCNC: -2.2 MMOL/L (ref -3–3)
BASOPHILS # BLD AUTO: 0.1 10E3/UL (ref 0–0.2)
BASOPHILS NFR BLD AUTO: 1 %
BLD PROD TYP BPU: NORMAL
BLOOD COMPONENT TYPE: NORMAL
BUN SERPL-MCNC: 8.9 MG/DL (ref 6–20)
CALCIUM SERPL-MCNC: 9.1 MG/DL (ref 8.8–10.4)
CHLORIDE SERPL-SCNC: 105 MMOL/L (ref 98–107)
CO COMPONENTS: NORMAL
CODING SYSTEM: NORMAL
CREAT SERPL-MCNC: 0.74 MG/DL (ref 0.51–0.95)
CROSSMATCH: NORMAL
DIASTOLIC BLOOD PRESSURE - MUSE: NORMAL MMHG
EGFRCR SERPLBLD CKD-EPI 2021: >90 ML/MIN/1.73M2
EOSINOPHIL # BLD AUTO: 0.5 10E3/UL (ref 0–0.7)
EOSINOPHIL NFR BLD AUTO: 4 %
ERYTHROCYTE [DISTWIDTH] IN BLOOD BY AUTOMATED COUNT: 22 % (ref 10–15)
ERYTHROCYTE [DISTWIDTH] IN BLOOD BY AUTOMATED COUNT: 22.7 % (ref 10–15)
GLUCOSE BLDC GLUCOMTR-MCNC: 100 MG/DL (ref 70–99)
GLUCOSE SERPL-MCNC: 95 MG/DL (ref 70–99)
GRAM/CULTURE INDICATED?: YES
HCO3 BLDV-SCNC: 21 MMOL/L (ref 21–28)
HCO3 SERPL-SCNC: 16 MMOL/L (ref 22–29)
HCT VFR BLD AUTO: 21.5 % (ref 35–47)
HCT VFR BLD AUTO: 23 % (ref 35–47)
HGB BLD-MCNC: 6.4 G/DL (ref 11.7–15.7)
HGB BLD-MCNC: 6.4 G/DL (ref 11.7–15.7)
HGB BLD-MCNC: 6.6 G/DL (ref 11.7–15.7)
IMM GRANULOCYTES # BLD: 0.1 10E3/UL
IMM GRANULOCYTES NFR BLD: 1 %
INTERPRETATION ECG - MUSE: NORMAL
ISSUE DATE AND TIME: NORMAL
ISSUE DATE AND TIME: NORMAL
LYMPHOCYTES # BLD AUTO: 1.9 10E3/UL (ref 0.8–5.3)
LYMPHOCYTES NFR BLD AUTO: 15 %
MCH RBC QN AUTO: 21.2 PG (ref 26.5–33)
MCH RBC QN AUTO: 21.8 PG (ref 26.5–33)
MCHC RBC AUTO-ENTMCNC: 28.7 G/DL (ref 31.5–36.5)
MCHC RBC AUTO-ENTMCNC: 29.8 G/DL (ref 31.5–36.5)
MCV RBC AUTO: 73 FL (ref 78–100)
MCV RBC AUTO: 74 FL (ref 78–100)
MONOCYTES # BLD AUTO: 0.6 10E3/UL (ref 0–1.3)
MONOCYTES NFR BLD AUTO: 5 %
NEUTROPHILS # BLD AUTO: 9.3 10E3/UL (ref 1.6–8.3)
NEUTROPHILS NFR BLD AUTO: 74 %
NRBC # BLD AUTO: 0 10E3/UL
NRBC BLD AUTO-RTO: 0 /100
NT-PROBNP SERPL-MCNC: 523 PG/ML (ref 0–900)
O2/TOTAL GAS SETTING VFR VENT: 0 %
OTHER UNITS TRANSFUSED: NORMAL
OXYHGB MFR BLDV: 91 % (ref 70–75)
P AXIS - MUSE: 66 DEGREES
PCO2 BLDV: 28 MM HG (ref 40–50)
PH BLDV: 7.49 [PH] (ref 7.32–7.43)
PLATELET # BLD AUTO: 425 10E3/UL (ref 150–450)
PLATELET # BLD AUTO: 465 10E3/UL (ref 150–450)
PO2 BLDV: 57 MM HG (ref 25–47)
POST RXN CLERICAL CHECK: NORMAL
POST SPECIMEN APPEARANCE: NORMAL
POST-RXN ABO/RH: NORMAL
POST-RXN POLY DAT: NEGATIVE
POTASSIUM SERPL-SCNC: 4 MMOL/L (ref 3.4–5.3)
PR INTERVAL - MUSE: 128 MS
PRODUCT CODE: NORMAL
QRS DURATION - MUSE: 76 MS
QT - MUSE: 340 MS
QTC - MUSE: 389 MS
R AXIS - MUSE: 75 DEGREES
RBC # BLD AUTO: 2.94 10E6/UL (ref 3.8–5.2)
RBC # BLD AUTO: 3.11 10E6/UL (ref 3.8–5.2)
SAO2 % BLDV: 93 % (ref 70–75)
SODIUM SERPL-SCNC: 137 MMOL/L (ref 135–145)
SPECIMEN EXPIRATION DATE: NORMAL
SYSTOLIC BLOOD PRESSURE - MUSE: NORMAL MMHG
T AXIS - MUSE: 59 DEGREES
T4 FREE SERPL-MCNC: 0.8 NG/DL (ref 0.9–1.7)
TSH SERPL DL<=0.005 MIU/L-ACNC: 20.48 UIU/ML (ref 0.3–4.2)
UNIT ABO/RH: NORMAL
UNIT BLOOD TYPE TRANSFUSION REACTION: NORMAL
UNIT NUMBER: NORMAL
UNIT STATUS: NORMAL
UNIT TYPE ISBT: 5100
VENTRICULAR RATE- MUSE: 79 BPM
WBC # BLD AUTO: 12.5 10E3/UL (ref 4–11)
WBC # BLD AUTO: 7.6 10E3/UL (ref 4–11)

## 2024-08-25 PROCEDURE — 80048 BASIC METABOLIC PNL TOTAL CA: CPT | Performed by: EMERGENCY MEDICINE

## 2024-08-25 PROCEDURE — 36415 COLL VENOUS BLD VENIPUNCTURE: CPT | Performed by: OBSTETRICS & GYNECOLOGY

## 2024-08-25 PROCEDURE — 99291 CRITICAL CARE FIRST HOUR: CPT

## 2024-08-25 PROCEDURE — 86922 COMPATIBILITY TEST ANTIGLOB: CPT | Performed by: EMERGENCY MEDICINE

## 2024-08-25 PROCEDURE — 258N000003 HC RX IP 258 OP 636: Performed by: EMERGENCY MEDICINE

## 2024-08-25 PROCEDURE — 99291 CRITICAL CARE FIRST HOUR: CPT | Mod: 25

## 2024-08-25 PROCEDURE — 250N000011 HC RX IP 250 OP 636: Performed by: OBSTETRICS & GYNECOLOGY

## 2024-08-25 PROCEDURE — 96375 TX/PRO/DX INJ NEW DRUG ADDON: CPT

## 2024-08-25 PROCEDURE — 81403 MOPATH PROCEDURE LEVEL 4: CPT | Performed by: PATHOLOGY

## 2024-08-25 PROCEDURE — P9016 RBC LEUKOCYTES REDUCED: HCPCS | Performed by: EMERGENCY MEDICINE

## 2024-08-25 PROCEDURE — 84443 ASSAY THYROID STIM HORMONE: CPT | Performed by: EMERGENCY MEDICINE

## 2024-08-25 PROCEDURE — 86900 BLOOD TYPING SEROLOGIC ABO: CPT | Performed by: EMERGENCY MEDICINE

## 2024-08-25 PROCEDURE — 82962 GLUCOSE BLOOD TEST: CPT

## 2024-08-25 PROCEDURE — 87040 BLOOD CULTURE FOR BACTERIA: CPT

## 2024-08-25 PROCEDURE — 86922 COMPATIBILITY TEST ANTIGLOB: CPT

## 2024-08-25 PROCEDURE — 86870 RBC ANTIBODY IDENTIFICATION: CPT

## 2024-08-25 PROCEDURE — 36415 COLL VENOUS BLD VENIPUNCTURE: CPT

## 2024-08-25 PROCEDURE — 85027 COMPLETE CBC AUTOMATED: CPT | Performed by: OBSTETRICS & GYNECOLOGY

## 2024-08-25 PROCEDURE — G0378 HOSPITAL OBSERVATION PER HR: HCPCS

## 2024-08-25 PROCEDURE — 85025 COMPLETE CBC W/AUTO DIFF WBC: CPT | Performed by: EMERGENCY MEDICINE

## 2024-08-25 PROCEDURE — 71045 X-RAY EXAM CHEST 1 VIEW: CPT

## 2024-08-25 PROCEDURE — 250N000011 HC RX IP 250 OP 636: Performed by: EMERGENCY MEDICINE

## 2024-08-25 PROCEDURE — 86078 PHYS BLOOD BANK SERV REACTJ: CPT | Performed by: PATHOLOGY

## 2024-08-25 PROCEDURE — 86900 BLOOD TYPING SEROLOGIC ABO: CPT

## 2024-08-25 PROCEDURE — 36430 TRANSFUSION BLD/BLD COMPNT: CPT

## 2024-08-25 PROCEDURE — 85018 HEMOGLOBIN: CPT

## 2024-08-25 PROCEDURE — 84439 ASSAY OF FREE THYROXINE: CPT | Performed by: EMERGENCY MEDICINE

## 2024-08-25 PROCEDURE — 250N000013 HC RX MED GY IP 250 OP 250 PS 637: Performed by: OBSTETRICS & GYNECOLOGY

## 2024-08-25 PROCEDURE — 86870 RBC ANTIBODY IDENTIFICATION: CPT | Performed by: EMERGENCY MEDICINE

## 2024-08-25 PROCEDURE — 250N000011 HC RX IP 250 OP 636

## 2024-08-25 PROCEDURE — 86850 RBC ANTIBODY SCREEN: CPT | Performed by: EMERGENCY MEDICINE

## 2024-08-25 PROCEDURE — 93005 ELECTROCARDIOGRAM TRACING: CPT

## 2024-08-25 PROCEDURE — 36415 COLL VENOUS BLD VENIPUNCTURE: CPT | Performed by: EMERGENCY MEDICINE

## 2024-08-25 PROCEDURE — 86923 COMPATIBILITY TEST ELECTRIC: CPT | Performed by: EMERGENCY MEDICINE

## 2024-08-25 PROCEDURE — 96374 THER/PROPH/DIAG INJ IV PUSH: CPT

## 2024-08-25 PROCEDURE — 82805 BLOOD GASES W/O2 SATURATION: CPT

## 2024-08-25 PROCEDURE — 83880 ASSAY OF NATRIURETIC PEPTIDE: CPT

## 2024-08-25 RX ORDER — AMOXICILLIN 250 MG
1 CAPSULE ORAL 2 TIMES DAILY PRN
Status: DISCONTINUED | OUTPATIENT
Start: 2024-08-25 | End: 2024-08-26 | Stop reason: HOSPADM

## 2024-08-25 RX ORDER — LEVOTHYROXINE SODIUM 100 UG/1
100 TABLET ORAL
Status: DISCONTINUED | OUTPATIENT
Start: 2024-08-26 | End: 2024-08-26 | Stop reason: HOSPADM

## 2024-08-25 RX ORDER — MEDROXYPROGESTERONE ACETATE 10 MG
20 TABLET ORAL 3 TIMES DAILY
Status: DISCONTINUED | OUTPATIENT
Start: 2024-08-25 | End: 2024-08-26 | Stop reason: HOSPADM

## 2024-08-25 RX ORDER — ACETAMINOPHEN 650 MG/1
650 SUPPOSITORY RECTAL EVERY 4 HOURS PRN
Status: DISCONTINUED | OUTPATIENT
Start: 2024-08-25 | End: 2024-08-26 | Stop reason: HOSPADM

## 2024-08-25 RX ORDER — ONDANSETRON 2 MG/ML
4 INJECTION INTRAMUSCULAR; INTRAVENOUS EVERY 6 HOURS PRN
Status: DISCONTINUED | OUTPATIENT
Start: 2024-08-25 | End: 2024-08-26 | Stop reason: HOSPADM

## 2024-08-25 RX ORDER — SODIUM CHLORIDE 9 MG/ML
INJECTION, SOLUTION INTRAVENOUS CONTINUOUS
Status: DISCONTINUED | OUTPATIENT
Start: 2024-08-25 | End: 2024-08-26 | Stop reason: HOSPADM

## 2024-08-25 RX ORDER — ONDANSETRON 4 MG/1
4 TABLET, ORALLY DISINTEGRATING ORAL EVERY 6 HOURS PRN
Status: DISCONTINUED | OUTPATIENT
Start: 2024-08-25 | End: 2024-08-26 | Stop reason: HOSPADM

## 2024-08-25 RX ORDER — KETOROLAC TROMETHAMINE 15 MG/ML
30 INJECTION, SOLUTION INTRAMUSCULAR; INTRAVENOUS ONCE
Status: COMPLETED | OUTPATIENT
Start: 2024-08-25 | End: 2024-08-25

## 2024-08-25 RX ORDER — AMOXICILLIN 250 MG
2 CAPSULE ORAL 2 TIMES DAILY PRN
Status: DISCONTINUED | OUTPATIENT
Start: 2024-08-25 | End: 2024-08-26 | Stop reason: HOSPADM

## 2024-08-25 RX ORDER — ONDANSETRON 2 MG/ML
4 INJECTION INTRAMUSCULAR; INTRAVENOUS EVERY 30 MIN PRN
Status: DISCONTINUED | OUTPATIENT
Start: 2024-08-25 | End: 2024-08-25

## 2024-08-25 RX ORDER — DIPHENHYDRAMINE HYDROCHLORIDE 50 MG/ML
25 INJECTION INTRAMUSCULAR; INTRAVENOUS ONCE
Status: COMPLETED | OUTPATIENT
Start: 2024-08-25 | End: 2024-08-25

## 2024-08-25 RX ORDER — ACETAMINOPHEN 325 MG/1
650 TABLET ORAL EVERY 4 HOURS PRN
Status: DISCONTINUED | OUTPATIENT
Start: 2024-08-25 | End: 2024-08-26 | Stop reason: HOSPADM

## 2024-08-25 RX ORDER — IBUPROFEN 600 MG/1
600 TABLET, FILM COATED ORAL EVERY 6 HOURS PRN
Status: DISCONTINUED | OUTPATIENT
Start: 2024-08-25 | End: 2024-08-26 | Stop reason: HOSPADM

## 2024-08-25 RX ADMIN — MEDROXYPROGESTERONE ACETATE 20 MG: 10 TABLET ORAL at 21:19

## 2024-08-25 RX ADMIN — KETOROLAC TROMETHAMINE 30 MG: 15 INJECTION, SOLUTION INTRAMUSCULAR; INTRAVENOUS at 09:11

## 2024-08-25 RX ADMIN — DIPHENHYDRAMINE HYDROCHLORIDE 25 MG: 50 INJECTION INTRAMUSCULAR; INTRAVENOUS at 21:00

## 2024-08-25 RX ADMIN — SODIUM CHLORIDE: 9 INJECTION, SOLUTION INTRAVENOUS at 09:11

## 2024-08-25 RX ADMIN — ONDANSETRON 4 MG: 2 INJECTION INTRAMUSCULAR; INTRAVENOUS at 20:50

## 2024-08-25 RX ADMIN — MEDROXYPROGESTERONE ACETATE 20 MG: 10 TABLET ORAL at 15:42

## 2024-08-25 ASSESSMENT — ACTIVITIES OF DAILY LIVING (ADL)
ADLS_ACUITY_SCORE: 31
ADLS_ACUITY_SCORE: 36
ADLS_ACUITY_SCORE: 36
ADLS_ACUITY_SCORE: 31
ADLS_ACUITY_SCORE: 31
ADLS_ACUITY_SCORE: 36
ADLS_ACUITY_SCORE: 32
ADLS_ACUITY_SCORE: 36
ADLS_ACUITY_SCORE: 32
ADLS_ACUITY_SCORE: 31
ADLS_ACUITY_SCORE: 36
ADLS_ACUITY_SCORE: 31
ADLS_ACUITY_SCORE: 36
ADLS_ACUITY_SCORE: 31
ADLS_ACUITY_SCORE: 31

## 2024-08-25 NOTE — PHARMACY-ADMISSION MEDICATION HISTORY
Pharmacy Intern Admission Medication History    Admission medication history is complete. The information provided in this note is only as accurate as the sources available at the time of the update.    Information Source(s): Patient and CareEverywhere/SureScripts via in-person    Pertinent Information:   -Pt stated not taking Levothyroxine, per Sure/Scripts it was started on 7/24 2024.    Changes made to PTA medication list:  Added: None  Deleted: None  Changed: None    Allergies reviewed with patient and updates made in EHR: yes    Medication History Completed By: Gladys Cole 8/25/2024 10:52 AM    PTA Med List   Medication Sig Last Dose    acetaminophen (TYLENOL) 325 MG tablet Take 2 tablets (650 mg) by mouth every 4 hours as needed for mild pain or other (and adjunct with moderate or severe pain or per patient request) prn    MAGNESIUM GLYCINATE PO Take 2 mLs by mouth daily 600 mg glycinate  300 mg taurate  300 mg citrate 8/24/2024 at pm    norgestimate-ethinyl estradiol (ORTHO-CYCLEN) 0.25-35 MG-MCG tablet Take 1 tablet by mouth daily 8/24/2024 at am    Pediatric Multivitamins-Iron (FRUITY CHEWS/IRON) CHEW Take 2-3 chew tab by mouth daily. Ferrous fumarate 18 mg per 3 gummies  Also contains, Niacin, Vitamin B6, Folate, vitamin B12, pantothenic acid, zinc 8/24/2024 at am

## 2024-08-25 NOTE — ED NOTES
[facility]  ED Nurse Handoff Report    ED Chief complaint: Shortness of Breath      ED Diagnosis:   Final diagnoses:   Menometrorrhagia   Anemia due to blood loss, acute   Pelvic pain in female   Uterine leiomyoma, unspecified location       Code Status: Full Code    Allergies: No Known Allergies    Patient Story: Pt presents to ed to be evaluated for sob.   Pt states that she was hospitalized on 7/22 for low hemoglobin, and states that she feels like it is low again. Last time the pt was her she was diagnosed with a fibroid, and adenomyosis and they prescribed her with birth control, but the pt states that she continues to have heavy bleeding.  Focused Assessment:  Patient is alert and oriented x 4, calm and cooperative. VS are stable, skin is warm and dry, respirations are even and non-labored on room air. Patient denied chest pain, shortness of breath, dizziness, and nausea.       Treatments and/or interventions provided:   Medications   ondansetron (ZOFRAN) injection 4 mg (has no administration in time range)   sodium chloride 0.9 % infusion ( Intravenous $New Bag 8/25/24 0911)   ketorolac (TORADOL) injection 30 mg (30 mg Intravenous $Given 8/25/24 0911)       Patient's response to treatments and/or interventions: Stable, pain is now 2/10    To be done/followed up on inpatient unit:  Monitor, administer blood products as ordred.     Does this patient have any cognitive concerns?:  None    Activity level - Baseline/Home:  Independent  Activity Level - Current:   Independent    Patient's Preferred language: English   Needed?: No    Isolation: None  Infection: Not Applicable  Patient tested for COVID 19 prior to admission: NO  Bariatric?: No    Vital Signs:   Vitals:    08/25/24 0832 08/25/24 0833 08/25/24 0900 08/25/24 0911   BP: (!) 145/82  (!) 145/97    Pulse: 112   91   Resp:  19  (!) 44   Temp: 98.7  F (37.1  C)      TempSrc: Temporal      SpO2:  100% 99% 98%   Weight:  69.4 kg (153  lb)         Cardiac Rhythm:     Was the PSS-3 completed:   Yes  What interventions are required if any?               Family Comments: Daughter is at the bedside.   OBS brochure/video discussed/provided to patient/family: No              Name of person given brochure if not patient: NA              Relationship to patient: NA    For the majority of the shift this patient's behavior was Green.   Behavioral interventions performed were  information and reassurance provided.  .    ED NURSE PHONE NUMBER: [phone]

## 2024-08-25 NOTE — PROGRESS NOTES
Pt arrived on unit at 1421. Admission completed. Pt is A&Ox4. VSS on RA. Blood transfusion complete, IV-SL. Daughter at bedside. Denies pain, nausea, SOB, numbness and tingling. Reports having dizziness/lightheadedness episodes when she first arrived on unit but feels better now.

## 2024-08-25 NOTE — PROGRESS NOTES
RECEIVING UNIT ED HANDOFF REVIEW    ED Nurse Handoff Report was reviewed by: Penny Chase RN on August 25, 2024 at 1:32 PM

## 2024-08-25 NOTE — PROGRESS NOTES
Observation goals  PRIOR TO DISCHARGE        Comments: -diagnostic tests and consults completed and resulted=Met  -vital signs normal or at patient baseline=Met  - anemia has been adequately treated=Partially Met, waiting for hg redraw  Nurse to notify provider when observation goals have been met and patient is ready for discharge.

## 2024-08-25 NOTE — H&P
[facility] Labor and Delivery History and Physical    Aleida Hagan MRN# [id number]   Age: 49 year old YOB: 1975     Date of Admission:  2024    Primary care provider: Rafael, Sigrid Riley           Chief Complaint and HPI:   Aledia Hagan is a 49 year old  female perimenopausal female who is being admitted for vaginal bleeding and symptomatic anemia.      Reviewed her recent admission for blood transfusion from 2024.  She received 4 units of packed red blood cells during this admission.  Hgb 3.5 on admission.  She had pelvic ultrasound done at that time that revealed and enlarged fibroid versus adenomyosis uterus. Right ovarian cyst noted at that time as well.  Hypothyroidism noted.  TSH 17.83 at that admission.  She was started on oral contraceptives.  Endometrial biopsy completed showed fragments of endometrium and endometrial polyp with early secretory changes.  No hyperplasia or malignancy.She was sent home on oral contraceptives.  She ended up running out of her prescription and was without hormones for 3 days.  She then took OTC contraceptive followed by starting her daughters trisprintec.  She felt like the trisprintec worked the best of anything but she has continued to bleed daily for 2 months straight.     She notes significant growth in her fibroid this summer.  She can palpate and see this on her exam.  It is tender to the touch.  She is now having to void frequently.  Notes some constipation today as well - no stool in 4 days.            Pregnancy history:     OBSTETRIC HISTORY:  No obstetric history on file.  OB History   No obstetric history on file.    x 2, ectopic pregnancy  GYN: endometriosis dx 20 years ago    History of ectopic pregnancy.   24 PAP smear negative   Not sexually active currently.      Active Problem List  Patient Active Problem List   Diagnosis    Menorrhagia with irregular cycle    Symptomatic anemia    Syncope, unspecified syncope  type    Hypothyroidism, unspecified type    Menometrorrhagia    Pelvic pain in female    Anemia due to blood loss, acute    Uterine leiomyoma, unspecified location       Medication Prior to Admission  (Not in a hospital admission)  .        Maternal Past Medical History:   No past medical history on file.  S/P laparoscopy for endometriosis dx in 2003  History of kidney failure as a teen  Hypothyroidism                    Family History:   This patient has no significant family history            Social History:     Social History     Tobacco Use    Smoking status: Not on file    Smokeless tobacco: Not on file   Substance Use Topics    Alcohol use: Not on file   .         Review of Systems:   C: NEGATIVE for fever, chills, change in weight  E/M: NEGATIVE for ear, mouth and throat problems  R: + shortness of breast  CV: NEGATIVE for chest pain, palpitations or peripheral edema          Physical Exam:   Vitals were reviewed  Temp ( F)     98.1-98.7  98.1 (36.7)    Pulse       81    Resp     10-44 Important   16    BP     132//97 Important   144/95 Important     SpO2 (%)             Constitutional: Awake, alert, cooperative, no apparent distress, and appears stated age.  Pale.  ENT: Normocephalic, without obvious abnormality, atraumatic  Neck: Supple, symmetrical, trachea midline, no adenopathy, thyroid symmetric, not enlarged and no tenderness, skin normal.  Hematologic / Lymphatic: No cervical lymphadenopathy and no supraclavicular lymphadenopathy.  Back: Symmetric, no curvature, spinous processes are non-tender on palpation, paraspinous muscles are non-tender on palpation, no costal vertebral tenderness.  Lungs: No increased work of breathing, good air exchange, clear to auscultation bilaterally, no crackles or wheezing.  Cardiovascular: Regular rate and rhythm, normal S1 and S2, no S3 or S4, and no murmur noted.  Abdomen: No scars, normal bowel sounds, soft, non-distended, non-tender,  visible fibroid that is mildly tender, no hepatosplenomegally.  Genitourinary: No urethral discharge, normal external genitalia, no hernia. Uterus enlarged 12 weeks in size, visible anterior fibroid.  Musculoskeletal: No redness, warmth, or swelling of the joints.  Full range of motion noted.  Motor strength is 5 out of 5 all extremities bilaterally.  Tone is normal.  Neurologic: Awake, alert, oriented to name, place and time.  Cranial nerves II-XII are grossly intact.    Neuropsychiatric: Normal affect, mood, orientation, memory and insight.  Skin: No rashes, erythema, pallor, petechia or purpura.    Labs: Hgb 6.6, plt 465, Cr 0.74  Blood type O+  TSH 20  Free 0.80      Pelvic ultrasounds:  IMPRESSION:  1.  Enlarged, heterogeneous uterus, could be replaced by a large  fibroid versus adenomyoma.  2.  Endometrium is not well visualized, most likely obscured by #1 but  could consider further evaluation with MRI to include underlying mass  or lesion, if clinically indicated on a nonemergent/outpatient basis.  3.  Relatively simple appearing right ovarian cystic lesion with  possible small peripheral calcification, consider attention on  follow-up imaging. No evidence of right ovarian torsion  4.  Nonvisualized left ovary.         Assessment:   Aleida Hagan is a  49 year old  female perimenopausal female who is being admitted for vaginal bleeding and symptomatic anemia.    Gyn hx is significant for endometriosis.  Now with symptomatic blood loss anemia. Hypothyroidism is poorly controlled.          Plan:    Anemia - will give 1 unit pRBCs.  Goal is hgb > 7. Will check hgb 4 hrs after completion of 1 unit pRBC.  - discussed IV iron infusion as well  - will need oral IV  - start provera 20 mg TID to stop bleeding - will need to take hormones until surgery  - discussed gnRH agonist to allow anemia to recover and fibroid to shrink in anticipation of surgery.  Reviewed risks of menopausal symptoms - hot flashes,  night sweats and mood disturbances - this will be added outpatient once bleeding has stabilized to avoid triggering additional bleeding while she is so anemic.  2.  GYN management - long history of endometriosis and now with enlarged uterus (likely fibroid but cannot exclude adenomyosis).  - discussed long term management strategies.  She is not interested in conservative management. She has been dealing with pain and bleeding issues for years. Anemic for many years.  - goal is definitive surgical management by way of hysterectomy.  Discussed davinici total hysterectomy with bilateral salpingectomy and possible removal of ovarian cyst.  - will need follow up pelvic ultrasound in the coming weeks out-patient.  - all questions addressed.  3.  Hypothyroidism  - continue synthroid.  Total time spent on reviewing records, documentation and bedside patient care: 60 minutes    Magda De Jesus MD

## 2024-08-25 NOTE — ED TRIAGE NOTES
Pt presents to ed to be evaluated for sob.   Pt states that she was hospitalized on 7/22 for low hemoglobin, and states that she feels like it is low again. Last time the pt was her she was diagnosed with a fibroid, and adenomyosis and they prescribed her with birth control, but the pt states that she continues to have heavy bleeding.        Triage Assessment (Adult)       Row Name 08/25/24 0880          Triage Assessment    Airway WDL WDL        Respiratory WDL    Rhythm/Pattern, Respiratory shortness of breath         Cardiac WDL    Cardiac WDL WDL        Cognitive/Neuro/Behavioral WDL    Cognitive/Neuro/Behavioral WDL WDL

## 2024-08-25 NOTE — PROGRESS NOTES
Admission/Transfer from: ED  2 RN skin assessment completed. Yes with Marilyn Lundberg  Significant findings include: none  WOC Nurse Consult Ordered? No

## 2024-08-25 NOTE — ED PROVIDER NOTES
Emergency Department Note      History of Present Illness     Chief Complaint   Shortness of Breath    HPI   Aleida Hagan is a 49 year old female who presents to the ED with her daughter for evaluation of shortness of breath. The patient reports that since her recent discharge, she is still experiencing heavy bleeding with clots and is short of breath. During this hospitalization, they did an endometrial biopsy for cancer and was prescribed birth control. She states they plan on doing a hysterectomy in the future because fever her endometriosis and adenomyosis. Patient does have an appointment with OBGYN scheduled on September 4th. Aleida has been taking Alieve for her pain, with her most recent dose being last night. She mentions that she had kidney failure at 17 years old, so she has occasional flank pain from this. Patient was also supposed to start thyroid medication, but has not. Denies history of breast cancer.     Independent Historian   None    Review of External Notes   I reviewed discharge summary from Dr. Urena on 7/23/24.  Discharge Summary  Hospitalist     Date of Admission:  7/22/2024  Date of Discharge:  7/23/2024  1:42 PM  Provider:  Martha Urena,         Discharge Diagnoses  Abnormal uterine bleeding   Menorrhagia   Acute blood loss anemia, symptomatic  Syncopal episode   Hypothyroidism, new diagnosis    Past Medical History     Medical History and Problem List   Symptomatic anemia  Hypothyroidism  Menorrhagia with irregular cycle  Insomnia  Depression   Goiter  Adjustment disorder with depressed mood    Medications   Synthroid  Ortho-Cyclen    Surgical History   The patient has no pertinent past surgical history.     Physical Exam     Patient Vitals for the past 24 hrs:   BP Temp Temp src Pulse Resp SpO2 Weight   08/25/24 1100 132/74 -- -- 67 11 -- --   08/25/24 0911 -- -- -- 91 (!) 44 98 % --   08/25/24 0900 (!) 145/97 -- -- -- -- 99 % --   08/25/24 0833 -- -- -- -- 19 100  % 69.4 kg (153 lb)   08/25/24 0832 (!) 145/82 98.7  F (37.1  C) Temporal 112 -- -- --     Physical Exam  Nursing note and vitals reviewed.    Constitutional:  Appears comfortable.    HENT:                Nose normal.  No discharge.      Oral mucosa is moist.  Eyes:    Conjunctivae are pale without injection.  Pupils are equal.  Cardiovascular:  Tachycardic, regular rhythm with normal S1 and S2.      Normal heart sounds and peripheral pulses 2+ and equal.    Pulmonary:  Effort normal and breath sounds clear to auscultation bilaterally.    GI:    Soft. No distension. Significant tenderness over the suprapubic region with guarding. Mild bilateral flank tenderness.  Musculoskeletal:  Normal range of motion. No extremity deformity.     No edema and no tenderness.    Neurological:   Alert and oriented. No focal weakness.  Skin:    Skin is warm and dry. No rash noted. Pale.  Psychiatric:   Behavior is normal. Appropriate mood and affect.     Judgment and thought content normal.     Diagnostics     Lab Results   Labs Ordered and Resulted from Time of ED Arrival to Time of ED Departure   BASIC METABOLIC PANEL - Abnormal       Result Value    Sodium 137      Potassium 4.0      Chloride 105      Carbon Dioxide (CO2) 16 (*)     Anion Gap 16 (*)     Urea Nitrogen 8.9      Creatinine 0.74      GFR Estimate >90      Calcium 9.1      Glucose 95     CBC WITH PLATELETS AND DIFFERENTIAL - Abnormal    WBC Count 12.5 (*)     RBC Count 3.11 (*)     Hemoglobin 6.6 (*)     Hematocrit 23.0 (*)     MCV 74 (*)     MCH 21.2 (*)     MCHC 28.7 (*)     RDW 22.0 (*)     Platelet Count 465 (*)     % Neutrophils 74      % Lymphocytes 15      % Monocytes 5      % Eosinophils 4      % Basophils 1      % Immature Granulocytes 1      NRBCs per 100 WBC 0      Absolute Neutrophils 9.3 (*)     Absolute Lymphocytes 1.9      Absolute Monocytes 0.6      Absolute Eosinophils 0.5      Absolute Basophils 0.1      Absolute Immature Granulocytes 0.1      Absolute  NRBCs 0.0     TSH WITH FREE T4 REFLEX - Abnormal    TSH 20.48 (*)    TYPE AND SCREEN, ADULT - Abnormal    ABO/RH(D) O POS      Antibody Screen Positive (*)     SPECIMEN EXPIRATION DATE [id number]     T4 FREE   PREPARE RED BLOOD CELLS (UNIT)   RED BLOOD CELLS HAVE AVAILABLE (UNIT)   TRANSFUSE RED BLOOD CELLS (UNIT)   ABO/RH TYPE AND SCREEN     Imaging   None    EKG   ECG taken at 0831, ECG read at 0845  Normal sinus rhythm  Nonspecific ST abnormality   Rate 79 bpm. HI interval 128 ms. QRS duration 76 ms. QT/QTc 340/389 ms. P-R-T axes 66 75 59.    Independent Interpretation   None    ED Course      Medications Administered   Medications   sodium chloride 0.9 % infusion ( Intravenous $New Bag 8/25/24 0911)   senna-docusate (SENOKOT-S/PERICOLACE) 8.6-50 MG per tablet 1 tablet (has no administration in time range)     Or   senna-docusate (SENOKOT-S/PERICOLACE) 8.6-50 MG per tablet 2 tablet (has no administration in time range)   ondansetron (ZOFRAN ODT) ODT tab 4 mg (has no administration in time range)     Or   ondansetron (ZOFRAN) injection 4 mg (has no administration in time range)   acetaminophen (TYLENOL) tablet 650 mg (has no administration in time range)     Or   acetaminophen (TYLENOL) Suppository 650 mg (has no administration in time range)   ibuprofen (ADVIL/MOTRIN) tablet 600 mg (has no administration in time range)   melatonin tablet 5 mg (has no administration in time range)   ketorolac (TORADOL) injection 30 mg (30 mg Intravenous $Given 8/25/24 0911)     Procedures   None      Discussion of Management   GynecologyAlmita and Dr. De Jesus     ED Course   ED Course as of 08/25/24 1200   Sun Aug 25, 2024   0837 I obtained history and examined the patient as noted above.    1000 I rechecked and updated the patient.    1035 I consulted with Almita from GYN oncology. Plan on discussing the patient with general gynecology.   1101 I consulted with Dr. De Jesus from OBGYN. She will admit the patient.   1107 I rechecked and  updated the patient.      Additional Documentation  None    Medical Decision Making / Diagnosis     CMS Diagnoses: None    MIPS       None    MDM   Aleida Hagan is a 49 year old female with a history of uterine adenomyosis and endometriosis along with menorrhagia presents with continued bleeding and passing of clots with lightheadedness.  She was admitted in July after she passed out with a hemoglobin of 6 and required blood.  She feels about the same and is having more pelvic cramping and pain.  She saw GYN oncology and they did a biopsy of her endometrium which came back benign and recommended hysterectomy.  The patient has not had fevers, she is tender here and very pale and mildly tachycardic when she presented.  Labs were obtained and IV fluids given.  She was not hypotensive.  Basic metabolic panel is normal, white count is 12.5 hemoglobin 6.6.  I found out low bit later that it was recommended she start her thyroid medication and never picked it up.  I did get a TSH and it is high at 20.48.  This can be started this hospitalization.  I typed and crossed her and consented her for 2 units of blood with 1 to be given now.  I talked with initially Gyn oncology and they felt that she should be seen by general gynecology as she does not have cancer.  They did offer to be consulted if this was going to be a complicated surgery.  I talked with Dr. De Jesus from GYN and she will admit the patient.  She can discuss hysterectomy whether it is during this hospitalization or as an outpatient when she stabilized.  Repeat hemoglobin after the first unit of blood she may need a second dose.  She was given Toradol which definitely helped her pain.    Disposition   The patient was admitted to the hospital.     Diagnosis     ICD-10-CM    1. Menometrorrhagia  N92.1       2. Anemia due to blood loss, acute  D62       3. Pelvic pain in female  R10.2       4. Uterine leiomyoma, unspecified location  D25.9          Discharge  Medications   New Prescriptions    No medications on file     Scribe Disclosure:  I, TIFFANY STORM, am serving as a scribe at 8:37 AM on 8/25/2024 to document services personally performed by Farhana Jones MD based on my observations and the provider's statements to me.      Farhana Jones MD  08/25/24 1200

## 2024-08-26 VITALS
HEART RATE: 67 BPM | BODY MASS INDEX: 30.39 KG/M2 | WEIGHT: 153 LBS | RESPIRATION RATE: 16 BRPM | OXYGEN SATURATION: 99 % | DIASTOLIC BLOOD PRESSURE: 73 MMHG | TEMPERATURE: 97.8 F | SYSTOLIC BLOOD PRESSURE: 147 MMHG

## 2024-08-26 LAB
ABO/RH(D): ABNORMAL
ANTIBODY ID: NORMAL
ANTIBODY ID: NORMAL
ANTIBODY SCREEN: POSITIVE
BLD PROD TYP BPU: NORMAL
BLOOD BANK CHART COMMENT: NORMAL
BLOOD COMPONENT TYPE: NORMAL
CODING SYSTEM: NORMAL
CROSSMATCH: NORMAL
ERYTHROCYTE [DISTWIDTH] IN BLOOD BY AUTOMATED COUNT: 22.4 % (ref 10–15)
HCT VFR BLD AUTO: 23.1 % (ref 35–47)
HGB BLD-MCNC: 6.1 G/DL (ref 11.7–15.7)
HGB BLD-MCNC: 6.9 G/DL (ref 11.7–15.7)
HGB BLD-MCNC: 9.2 G/DL (ref 11.7–15.7)
ISSUE DATE AND TIME: NORMAL
ISSUE DATE AND TIME: NORMAL
MCH RBC QN AUTO: 22.9 PG (ref 26.5–33)
MCHC RBC AUTO-ENTMCNC: 29.9 G/DL (ref 31.5–36.5)
MCV RBC AUTO: 77 FL (ref 78–100)
PLATELET # BLD AUTO: 365 10E3/UL (ref 150–450)
RBC # BLD AUTO: 3.01 10E6/UL (ref 3.8–5.2)
SPECIMEN EXPIRATION DATE: ABNORMAL
SPECIMEN EXPIRATION DATE: NORMAL
UNIT ABO/RH: NORMAL
UNIT NUMBER: NORMAL
UNIT STATUS: NORMAL
UNIT TYPE ISBT: 5100
UNIT TYPE ISBT: 5100
UNIT TYPE ISBT: 9500
WBC # BLD AUTO: 6.8 10E3/UL (ref 4–11)

## 2024-08-26 PROCEDURE — G0378 HOSPITAL OBSERVATION PER HR: HCPCS

## 2024-08-26 PROCEDURE — 85027 COMPLETE CBC AUTOMATED: CPT | Performed by: OBSTETRICS & GYNECOLOGY

## 2024-08-26 PROCEDURE — P9016 RBC LEUKOCYTES REDUCED: HCPCS

## 2024-08-26 PROCEDURE — 36430 TRANSFUSION BLD/BLD COMPNT: CPT

## 2024-08-26 PROCEDURE — 85018 HEMOGLOBIN: CPT | Performed by: OBSTETRICS & GYNECOLOGY

## 2024-08-26 PROCEDURE — 36415 COLL VENOUS BLD VENIPUNCTURE: CPT | Performed by: OBSTETRICS & GYNECOLOGY

## 2024-08-26 PROCEDURE — 250N000013 HC RX MED GY IP 250 OP 250 PS 637: Performed by: OBSTETRICS & GYNECOLOGY

## 2024-08-26 RX ORDER — IBUPROFEN 200 MG
600 TABLET ORAL EVERY 6 HOURS PRN
COMMUNITY
Start: 2024-08-26

## 2024-08-26 RX ORDER — MEDROXYPROGESTERONE ACETATE 10 MG
20 TABLET ORAL 3 TIMES DAILY
Qty: 90 TABLET | Refills: 2 | Status: SHIPPED | OUTPATIENT
Start: 2024-08-26

## 2024-08-26 RX ORDER — AMOXICILLIN 250 MG
1 CAPSULE ORAL 2 TIMES DAILY PRN
Qty: 30 TABLET | Refills: 0 | Status: SHIPPED | OUTPATIENT
Start: 2024-08-26

## 2024-08-26 RX ORDER — FERROUS SULFATE 325(65) MG
325 TABLET ORAL
Qty: 30 TABLET | Refills: 1 | Status: SHIPPED | OUTPATIENT
Start: 2024-08-26

## 2024-08-26 RX ORDER — ACETAMINOPHEN 325 MG/1
650 TABLET ORAL EVERY 4 HOURS PRN
COMMUNITY
Start: 2024-08-26

## 2024-08-26 RX ORDER — LEVOTHYROXINE SODIUM 100 UG/1
100 TABLET ORAL
Qty: 30 TABLET | Refills: 1 | Status: SHIPPED | OUTPATIENT
Start: 2024-08-27

## 2024-08-26 RX ADMIN — MEDROXYPROGESTERONE ACETATE 20 MG: 10 TABLET ORAL at 15:01

## 2024-08-26 RX ADMIN — MEDROXYPROGESTERONE ACETATE 20 MG: 10 TABLET ORAL at 08:19

## 2024-08-26 RX ADMIN — LEVOTHYROXINE SODIUM 100 MCG: 100 TABLET ORAL at 06:42

## 2024-08-26 ASSESSMENT — ACTIVITIES OF DAILY LIVING (ADL)
ADLS_ACUITY_SCORE: 31

## 2024-08-26 NOTE — PROVIDER NOTIFICATION
MD Notification    Notified Person: MD    Notified Person Name: SONNY Sierra    Notification Date/Time: 08/26 @ 00:56    Notification Interaction: Vocera    Purpose of Notification: FYI: Hgb 6.1, pt stable and asymptomatic. Reaction testing in progress. Thanks, Morgan    Orders Received:    Comments:

## 2024-08-26 NOTE — PROGRESS NOTES
Observation goals  PRIOR TO DISCHARGE        Comments:   -diagnostic tests and consults completed and resulted=Not Met, OB/GYN following  -vital signs normal or at patient baseline=Met  - anemia has been adequately treated=Partially met, pending HGB recheck  Nurse to notify provider when observation goals have been met and patient is ready for discharge.

## 2024-08-26 NOTE — PROVIDER NOTIFICATION
MD Notification    Notified Person: MD    Notified Person Name:Dr De JesusAbbeyie    Notification Date/Time:11:56 AM   08/26/24      Notification Interaction:amcom pager     Purpose of Notification:  blood infusion completed. HGB ordered for 1515. Did you still want and additional unit given. ordered still active. Also Pt is wondering about iron infusion, will that be needed?     Orders Received:Per Dr De Jesus, no need for IV iron, addition unit of blood discontinued   Comments:

## 2024-08-26 NOTE — PROGRESS NOTES
PRIMARY Concern: Anemia due to heavy vaginal bleeding. SOB  SAFETY RISK Concerns (fall risk, behaviors, etc.): None      Isolation/Type: None  Tests/Procedures for NEXT shift: None  Consults? (Pending/following, signed-off?) OB/GYN following  Where is patient from? (Home, TCU, etc.): Home  Other Important info for NEXT shift: RRT called for shortness of breath/dizziness during blood transfusion. Hg still low, waiting for pathology to give okay to house NP for another blood transfusion. See note. Patient has appointment w/ primary ob/gyn on Sept 4th to discuss hysterectomy.   Anticipated DC date & active delays: TBD  _____________________________________________________________________________  SUMMARY NOTE:            Orientation/Cognitive: AxOx4, neuros intact (Q4)  Observation Goals (Met/ Not Met): Not Met  Mobility Level/Assist Equipment: Ind  Antibiotics & Plan (IV/po, length of tx left): None  Pain Management: Denies pain  Tele/VS/O2: VSS on RA  ABNL Lab/BG: Hgb=6.4 (transfusion reaction, awaiting pathology)  Diet: Reg  Bowel/Bladder: Cont, vaginal bleeding. Saturated 5 pads since 8:30am.  Skin Concerns: None  Drains/Devices: PIV SL  Patient Stated Goal for Today: To get better

## 2024-08-26 NOTE — DISCHARGE SUMMARY
Discharge instructions given, AVS printed and reviewed with pt, daughter at bedside. Medications reviewed and administration instructions provided. VSS on room air, Pt is A/Ox4, IND. Denies SOB or dizziness/lightheadedness. Pt discharged, daughter provided ride home. Pt to follow up with OBGYN.

## 2024-08-26 NOTE — PROGRESS NOTES
[facility] Gynecology Progress Note    Subjective:   HD#2 -   Acute on chronic blood loss anemia - symptomatic.     Notes that bleeding continues but is not heavy at all.  No clots.  Changed her pad 3 times overnight when she voided - not because of soiling.            Interval History:   Doing well.  Continues to improve.  Concerns for possible transfusion reaction overnight.  She was evaluated by rapid response team and hospitalist.  She completed evaluation of transfusion reaction.  Received IV benadryl.  Chest x-ray was normal.  She has now just completed her 2nd packed red blood cells.            Significant Problems:      Patient Active Problem List   Diagnosis    Menorrhagia with irregular cycle    Symptomatic anemia    Syncope, unspecified syncope type    Hypothyroidism, unspecified type    Menometrorrhagia    Pelvic pain in female    Anemia due to blood loss, acute    Uterine leiomyoma, unspecified location             Review of Systems:    The patient denies any chest pain, shortness of breath, excessive pain, fever, chills, nausea or vomiting.          Medications:   All medications related to the patient's surgery have been reviewed  Current Facility-Administered Medications   Medication Dose Route Frequency Provider Last Rate Last Admin    acetaminophen (TYLENOL) tablet 650 mg  650 mg Oral Q4H PRN Magda De Jesus MD        Or    acetaminophen (TYLENOL) Suppository 650 mg  650 mg Rectal Q4H PRN Magda De Jesus MD        ibuprofen (ADVIL/MOTRIN) tablet 600 mg  600 mg Oral Q6H PRN Magda De Jesus MD        levothyroxine (SYNTHROID/LEVOTHROID) tablet 100 mcg  100 mcg Oral QAM AC Magda De Jesus MD   100 mcg at 08/26/24 0642    medroxyPROGESTERone (PROVERA) tablet 20 mg  20 mg Oral TID Magda De Jesus MD   20 mg at 08/26/24 0819    melatonin tablet 5 mg  5 mg Oral At Bedtime PRN Magda De Jesus MD        ondansetron (ZOFRAN ODT) ODT tab 4 mg  4 mg Oral Q6H PRN Magda De Jesus MD        Or    ondansetron  (ZOFRAN) injection 4 mg  4 mg Intravenous Q6H PRN Magda De Jesus MD   4 mg at 08/25/24 2050    senna-docusate (SENOKOT-S/PERICOLACE) 8.6-50 MG per tablet 1 tablet  1 tablet Oral BID PRN Magda De Jesus MD        Or    senna-docusate (SENOKOT-S/PERICOLACE) 8.6-50 MG per tablet 2 tablet  2 tablet Oral BID PRN Magda De Jesus MD        sodium chloride 0.9 % infusion   Intravenous Continuous Farhana Jones  mL/hr at 08/25/24 0911 New Bag at 08/25/24 0911             Physical Exam:   All vitals stable  Temp: 98.2  F (36.8  C) Temp src: Oral BP: (!) 145/79 Pulse: 97   Resp: 18 SpO2: 99 % O2 Device: None (Room air)    GEN: NAD  CHEST: Bilat CTA  HEART: RRR nml S1, S2  ABD: Soft, tenderness to palpation of fibroid  Ext: no edema, no CT          Data:   All laboratory data related to this surgery reviewed  Hemoglobin   Date Value Ref Range Status   08/26/2024 6.9 (LL) 11.7 - 15.7 g/dL Final   08/25/2024 6.1 (LL) 11.7 - 15.7 g/dL Final   08/25/2024 6.4 (LL) 11.7 - 15.7 g/dL Final   08/25/2024 6.4 (LL) 11.7 - 15.7 g/dL Final   08/25/2024 6.6 (LL) 11.7 - 15.7 g/dL Final            Assessment and Plan:    Assessment:   HD#2 -   Acute on chronic blood loss anemia - symptomatic.     No excessive bleeding      Plan:    Anemia - will give 1 additional unit pRBCs.  Goal is hgb > 7. Will check hgb 4 hrs after completion.  This will complete 3 units total.  - start provera 20 mg TID to stop bleeding - will need to take hormones until surgery - reviewed importance of not stopping therapy until planned surgery as this will trigger recurrent bleeding.    - discussed gnRH agonist to allow anemia to recover and fibroid to shrink in anticipation of surgery.  Reviewed risks of menopausal symptoms - hot flashes, night sweats and mood disturbances - this will be added outpatient once bleeding has stabilized to avoid triggering additional bleeding while she is so anemic. This will be further addressed outpatient.    - patient needs to  call [facility] at [phone] to schedule pelvic ultrasound and in office consult within 2 weeks of discharge.  2.  GYN management - long history of endometriosis and now with enlarged uterus (likely fibroid but cannot exclude adenomyosis).  - discussed long term management strategies.  She is not interested in conservative management. She has been dealing with pain and bleeding issues for years. Anemic for many years.  - goal is definitive surgical management by way of hysterectomy.  Discussed davinici total hysterectomy with bilateral salpingectomy and possible removal of ovarian cyst.  3.  Hypothyroidism  - continue synthroid  - reviewed importance of thyroid control on bleeding management  4.  Discharge planning for later today.        Magda De Jesus MD

## 2024-08-26 NOTE — PROGRESS NOTES
Spoke to RN.  Patient clinically feeling better.  Repeat hgb 6.4 after 1 unit pRBC.    Patient notes ongoing bleeding.  It is actually lighter than in has been in recent days.  RN notes that she has used 4 pads in the past 6 hours.  Will transfuse 2 additional units now.  Will need hgb 4 hours after the 2 units are transfused.  Continue provera 20 mg TID.  [place]ft

## 2024-08-26 NOTE — PROGRESS NOTES
Transfusion reaction testing complete, negative antibodies. Cleared by pathologist for transfusion.    Plan:  - Prepare and transfuse 2 units PRBC  - Recheck Hgb 4 hours after completion of 2nd unit

## 2024-08-26 NOTE — PROGRESS NOTES
Received verbal order from Dr De Jesus for discharge order. Pt is ok to discharge home. Order placed.

## 2024-08-26 NOTE — PROGRESS NOTES
Observation goals  PRIOR TO DISCHARGE        Comments: -diagnostic tests and consults completed and resulted=Not Met, OB/GYN following  -vital signs normal or at patient baseline=Met  - anemia has been adequately treated=Not Met, RRT called for possible transfusion reaction  Nurse to notify provider when observation goals have been met and patient is ready for discharge.

## 2024-08-26 NOTE — PLAN OF CARE
PRIMARY Concern: heavy vaginal bleeding   Tests/Procedures for NEXT shift: none   Consults? (Pending/following, signed-off?): OB-GYN following   Safety Risk CONCERNS (fall risk, behaviors, etc.): none       Where is patient from? (Home, TCU, etc.): home   Isolation/Type: none   Other Important info for next shift: transfused 2 units of  blood today, denies SOB; daughter at bedside   Anticipated DC date & active delays: today   SUMMARY NOTE:  Orientation/Cognitive: A/Ox4  Observation Goals (Met/ Not Met): met   Mobility Level/Assist Equipment: IND   Antibiotics & Plan (IV/po, length of tx left): none   Pain Management: denies pain   Tele/VS/O2: VSS on room air   ABNL Lab/BG: HGB 9.2  Diet: regular   Bowel/Bladder: up to bathroom; vaginal bleeding, decreased amount of bleeding per pt  Skin Concerns: WNL  Drains/Devices: IV removed   Patient Stated Goal for Today: rest

## 2024-08-26 NOTE — CODE/RAPID RESPONSE
[facility]  House SHAUN RRT Note  8/25/2024   Time called: 2039  RRT called for: Possible transfusion reaction with symptoms of shortness of breath and nausea  Code status: Full Code    Assessment & Plan   Patient is a 49 year old female with PMH significant for hypothyroidism. Patient was admitted with vaginal bleeding and symptomatic anemia requiring blood transfusion. Patient's Hgb on admission was 6.6 and dropped to 6.4 prior to 2 units PRBC ordered for transfusion.    I was paged to the bedside to evaluate Ms. Aleida Hagan for an acute episode of dyspnea, nausea, and anxiety.    Upon my arrival the patient was supine in bed and in no acute distress. Patient endorsed nausea and shortness of breath that started 20 minutes after her second unit of PRBC was started. Patient was very anxious but vitally stable and on room air. Patient was warm and dry with 2+ pulses in all extremities, denied fever/chills, denied chest pain, dizziness, numbness, or tingling. Lung sounds clear, not hypoxic. No rash or hives present.    BP (!) 150/87   Pulse 65   Temp 98.7  F (37.1  C) (Oral)   Resp 24   Wt 69.4 kg (153 lb)   SpO2 100%   BMI 30.39 kg/m        I discussed the reaction with blood bank pathologist on-call who recommended antibody testing as patient would likely need more blood products tonight and to wait for these results as long as the patient was stable. Patient seems stable enough to wait but she will likely need 2 units PRBC tonight as her hemoglobin had not improved after 1 unit.    Diagnosis:  -- Suspected transfusion reaction; TACO?  Patient developed shortness of breath and became very anxious about 20 minutes after starting her second unit of PRBCs. The only true reaction symptom she had was dyspnea but she remains on room air and CXR was clear with no infiltrate or pulmonary vascular congestion. BNP was not elevated and all other vitals stable, mild hypertension 150/87  mmHg.    Plan:  -- CXR  -- Labs   *CBC   *Blood reaction panel   *BNP   *VBG: mild respiratory alkalosis, patient was hyperventilating and anxious   *Recheck hemoglobin at 0030  -- Beneadryl 25mg IV once  -- Transfuse 2 units PRBC as soon as reaction testing is completed    At the conclusion of this RRT patient was hemodynamically stable and will remain on current unit.    Her history is significant for:  No past medical history on file.  No past surgical history on file.    Review of Systems   The 10 point Review of Systems is negative other than noted in the HPI or here.     Allergies   No Known Allergies    Physical Exam   Physical Exam  Constitutional:       General: She is not in acute distress.     Appearance: She is not ill-appearing.   HENT:      Mouth/Throat:      Mouth: Mucous membranes are moist.   Eyes:      Pupils: Pupils are equal, round, and reactive to light.   Cardiovascular:      Rate and Rhythm: Normal rate and regular rhythm.      Pulses: Normal pulses.   Abdominal:      General: Abdomen is flat. There is no distension.      Palpations: Abdomen is soft.      Tenderness: There is no abdominal tenderness.   Musculoskeletal:      Right lower leg: No edema.      Left lower leg: No edema.   Skin:     General: Skin is warm and dry.      Capillary Refill: Capillary refill takes less than 2 seconds.   Neurological:      General: No focal deficit present.      Mental Status: She is alert and oriented to person, place, and time.   Psychiatric:         Mood and Affect: Mood is anxious.         Vital Signs with Ranges:  Temp:  [97  F (36.1  C)-98.8  F (37.1  C)] 98.7  F (37.1  C)  Pulse:  [] 70  Resp:  [10-44] 24  BP: (132-162)/() 161/92  SpO2:  [98 %-100 %] 100 %  No intake/output data recorded.    Data   Results for orders placed or performed during the hospital encounter of 08/25/24 (from the past 24 hour(s))   EKG 12 lead   Result Value Ref Range    Systolic Blood Pressure  mmHg    Diastolic  Blood Pressure  mmHg    Ventricular Rate 79 BPM    Atrial Rate 79 BPM    WI Interval 128 ms    QRS Duration 76 ms     ms    QTc 389 ms    P Axis 66 degrees    R AXIS 75 degrees    T Axis 59 degrees    Interpretation ECG       Sinus rhythm  Nonspecific ST abnormality  Abnormal ECG  When compared with ECG of 22-Jul-2024 06:51,  QT has shortened  Confirmed by GENERATED REPORT, COMPUTER ([id number]),  Nadine Matthews ([id number]) on 8/25/2024 9:19:02 AM     CBC with platelets differential    Narrative    The following orders were created for panel order CBC with platelets differential.  Procedure                               Abnormality         Status                     ---------                               -----------         ------                     CBC with platelets and d...[[id number]]  Abnormal            Final result                 Please view results for these tests on the individual orders.   Basic metabolic panel   Result Value Ref Range    Sodium 137 135 - 145 mmol/L    Potassium 4.0 3.4 - 5.3 mmol/L    Chloride 105 98 - 107 mmol/L    Carbon Dioxide (CO2) 16 (L) 22 - 29 mmol/L    Anion Gap 16 (H) 7 - 15 mmol/L    Urea Nitrogen 8.9 6.0 - 20.0 mg/dL    Creatinine 0.74 0.51 - 0.95 mg/dL    GFR Estimate >90 >60 mL/min/1.73m2    Calcium 9.1 8.8 - 10.4 mg/dL    Glucose 95 70 - 99 mg/dL   ABO/Rh type and screen    Narrative    The following orders were created for panel order ABO/Rh type and screen.  Procedure                               Abnormality         Status                     ---------                               -----------         ------                     Adult Type and Screen[[id number]]                            Edited Result - FINAL        Please view results for these tests on the individual orders.   CBC with platelets and differential   Result Value Ref Range    WBC Count 12.5 (H) 4.0 - 11.0 10e3/uL    RBC Count 3.11 (L) 3.80 - 5.20 10e6/uL    Hemoglobin 6.6 (LL) 11.7 - 15.7 g/dL     Hematocrit 23.0 (L) 35.0 - 47.0 %    MCV 74 (L) 78 - 100 fL    MCH 21.2 (L) 26.5 - 33.0 pg    MCHC 28.7 (L) 31.5 - 36.5 g/dL    RDW 22.0 (H) 10.0 - 15.0 %    Platelet Count 465 (H) 150 - 450 10e3/uL    % Neutrophils 74 %    % Lymphocytes 15 %    % Monocytes 5 %    % Eosinophils 4 %    % Basophils 1 %    % Immature Granulocytes 1 %    NRBCs per 100 WBC 0 <1 /100    Absolute Neutrophils 9.3 (H) 1.6 - 8.3 10e3/uL    Absolute Lymphocytes 1.9 0.8 - 5.3 10e3/uL    Absolute Monocytes 0.6 0.0 - 1.3 10e3/uL    Absolute Eosinophils 0.5 0.0 - 0.7 10e3/uL    Absolute Basophils 0.1 0.0 - 0.2 10e3/uL    Absolute Immature Granulocytes 0.1 <=0.4 10e3/uL    Absolute NRBCs 0.0 10e3/uL   Adult Type and Screen   Result Value Ref Range    ABO/RH(D) O POS     Antibody Screen Negative Negative    SPECIMEN EXPIRATION DATE 20240828235900    TSH with free T4 reflex   Result Value Ref Range    TSH 20.48 (H) 0.30 - 4.20 uIU/mL   T4 free   Result Value Ref Range    Free T4 0.80 (L) 0.90 - 1.70 ng/dL   Antibody Screen - Red Cell   Result Value Ref Range    Antibody Screen Negative Negative    SPECIMEN EXPIRATION DATE 20240828235900    Prepare red blood cells (unit)   Result Value Ref Range    Blood Component Type Red Blood Cells     Product Code J8707H35     Unit Status Transfused     Unit Number [id number]     CROSSMATCH COMPATIBLE     CODING SYSTEM [id number]     ISSUE DATE AND TIME 20240825125200     UNIT ABO/RH O+     UNIT TYPE ISBT 5100    Prepare red blood cells (unit)   Result Value Ref Range    Blood Component Type Red Blood Cells     Product Code E1019Y75     Unit Status Transfused     Unit Number [id number]     CROSSMATCH COMPATIBLE     CODING SYSTEM MEKF905     ISSUE DATE AND TIME 20240825200200     UNIT ABO/RH O+     UNIT TYPE ISBT 5100    Prepare red blood cells (unit)   Result Value Ref Range    Blood Component Type Red Blood Cells     Product Code M3803G69     Unit Status Ready for issue     Unit Number [id number]      CROSSMATCH Compatible     CODING SYSTEM NUDQ514    Hemoglobin   Result Value Ref Range    Hemoglobin 6.4 (LL) 11.7 - 15.7 g/dL   Glucose by meter   Result Value Ref Range    GLUCOSE BY METER POCT 100 (H) 70 - 99 mg/dL   Transfusion Reaction Investigation    Narrative    The following orders were created for panel order Transfusion Reaction Investigation.  Procedure                               Abnormality         Status                     ---------                               -----------         ------                     Transfusion reaction ciara...[[id number]]                      In process                   Please view results for these tests on the individual orders.   Nt probnp inpatient   Result Value Ref Range    N terminal Pro BNP Inpatient 523 0 - 900 pg/mL   Blood gas venous   Result Value Ref Range    pH Venous 7.49 (H) 7.32 - 7.43    pCO2 Venous 28 (L) 40 - 50 mm Hg    pO2 Venous 57 (H) 25 - 47 mm Hg    Bicarbonate Venous 21 21 - 28 mmol/L    Base Excess/Deficit Venous -2.2 -3.0 - 3.0 mmol/L    FIO2 0     Oxyhemoglobin Venous 91 (H) 70 - 75 %    O2 Sat, Venous 93.0 (H) 70.0 - 75.0 %    Narrative    In healthy individuals, oxyhemoglobin (O2Hb) and oxygen saturation (SO2) are approximately equal. In the presence of dyshemoglobins, oxyhemoglobin can be considerably lower than oxygen saturation.   CBC with platelets   Result Value Ref Range    WBC Count 7.6 4.0 - 11.0 10e3/uL    RBC Count 2.94 (L) 3.80 - 5.20 10e6/uL    Hemoglobin 6.4 (LL) 11.7 - 15.7 g/dL    Hematocrit 21.5 (L) 35.0 - 47.0 %    MCV 73 (L) 78 - 100 fL    MCH 21.8 (L) 26.5 - 33.0 pg    MCHC 29.8 (L) 31.5 - 36.5 g/dL    RDW 22.7 (H) 10.0 - 15.0 %    Platelet Count 425 150 - 450 10e3/uL   XR Chest Port 1 View    Narrative    EXAM: XR CHEST PORT 1 VIEW  LOCATION: [facility]  DATE: 8/25/2024    INDICATION: Shortness of breath, suspected transfusion reaction  COMPARISON: None.      Impression    IMPRESSION: Negative  chest with no acute cardiopulmonary abnormalities.     COVID-19 PCR Results           No data to display              COVID-19 Antibody Results, Testing for Immunity           No data to display                Time Spent on this Encounter   I spent 45 minutes of critical care time on the unit/floor managing the care of Aleida Hagan. Upon evaluation, this patient had a high probability of imminent or life-threatening deterioration due to transfusion reaction, which required my direct attention, intervention, and personal management. 100% of my time was spent at the bedside counseling the patient and/or coordinating care regarding services listed in this note.      DIONICIO Chiu, CNP  Hospitalist - House [facility]  Text me on the [employer] [facility] for a textback  Text page with web based paging for a callback

## 2024-08-26 NOTE — PROGRESS NOTES
Observation goals  PRIOR TO DISCHARGE        Comments:   -diagnostic tests and consults completed and resulted=Not Met, OB/GYN following  -vital signs normal or at patient baseline=Met  - anemia has been adequately treated=Partially met, Blood transfusion is ongoing     Nurse to notify provider when observation goals have been met and patient is ready for discharge.

## 2024-08-26 NOTE — DISCHARGE INSTRUCTIONS
patient needs to call [facility] at [phone] to schedule pelvic ultrasound and in office consult within 2 weeks of discharge.

## 2024-08-26 NOTE — PLAN OF CARE
PRIMARY Concern: Anemia due to heavy vaginal bleeding. SOB  SAFETY RISK Concerns (fall risk, behaviors, etc.): None      Isolation/Type: None  Tests/Procedures for NEXT shift: AM labs  Consults? (Pending/following, signed-off?) OB/GYN following  Where is patient from? (Home, TCU, etc.): Home  Other Important info for NEXT shift: Transfusion reaction yesterday. Recheck Hgb 6.1, transfusion started this morning to be completed after 7am, 1 more unit to be transfused after 7am    Anticipated DC date & active delays: TBD  _____________________________________________________________________________  SUMMARY NOTE:            Orientation/Cognitive: AxO x4  Observation Goals (Met/ Not Met): Not Met  Mobility Level/Assist Equipment: Ind  Antibiotics & Plan (IV/po, length of tx left): None  Pain Management: Denies pain  Tele/VS/O2: VSS on RA  ABNL Lab/BG: Hgb 6.1, transfusing   Diet: Reg  Bowel/Bladder: Cont, vaginal bleeding. Pt states it lighter tonight   Skin Concerns: None  Drains/Devices: PIV transfusing PRBC   Patient Stated Goal for Today: Rest

## 2024-08-27 LAB
BKR LAB AP TR RXN INTERPRETATION: NORMAL
BKR LAB AP TRAN RXN RECOMMENDATION: NORMAL
BKR LAB AP TRANS SIGNS AND SX: NORMAL
BKR LAB AP TRANSFUSION REACTION: NORMAL
PATH REPORT.ADDENDUM SPEC: NORMAL
PATH REPORT.COMMENTS IMP SPEC: NORMAL
PATH REPORT.COMMENTS IMP SPEC: NORMAL

## 2024-08-28 ENCOUNTER — PATIENT OUTREACH (OUTPATIENT)
Dept: CARE COORDINATION | Facility: CLINIC | Age: 49
End: 2024-08-28

## 2024-08-28 NOTE — PROGRESS NOTES
Clinic Care Coordination Contact  [facility]/Voicemail    Clinical Data: Care Coordinator Outreach    Outreach Documentation Number of Outreach Attempt   8/28/2024   9:09 AM 2       Left message on patient's voicemail with call back information and requested return call.    Care Coordinator will do no further outreaches at this time.    Katty Goldstein  [phone]  Care

## 2024-08-29 LAB — SCANNED LAB RESULT: NORMAL

## 2024-08-31 LAB — BACTERIA BLD CULT: NO GROWTH

## 2024-10-07 ENCOUNTER — PATIENT OUTREACH (OUTPATIENT)
Dept: ONCOLOGY | Facility: CLINIC | Age: 49
End: 2024-10-07

## 2024-10-07 NOTE — LETTER
10/7/2024      Aleida Hagan  [address]  [place] [address]      Dear Aleida-    We have tried to contact you multiple times without success.  We'd like to help you set up an appointment to see Dr. Dunne for your symptoms.  Please reach out to our office so we can assist you with setting up an appointment as soon as possible.  Our office phone number is [phone].      Thank you,      [facility] Scheduling- [place]  [phone]

## 2024-10-07 NOTE — PROGRESS NOTES
called pt to help schedule her for a hospital follow up for her symptoms.  Pt did not answer.   left voicemail requesting pt return our phone call when she is able so we can help schedule her for an appointment with Dr. Dunne.     Dr. Dunne has attempted to reach her multiple times without success.   also sent pt a letter requesting she call our office to set up an appointment with Dr. Dunne.    Rama Yepez, RN, BSN    RN Care Coordinator  [facility]  [phone]

## 2025-01-20 ENCOUNTER — LAB REQUISITION (OUTPATIENT)
Dept: LAB | Facility: CLINIC | Age: 50
End: 2025-01-20

## 2025-01-20 DIAGNOSIS — Z87.448 PERSONAL HISTORY OF OTHER DISEASES OF URINARY SYSTEM: ICD-10-CM

## 2025-01-20 DIAGNOSIS — E03.9 HYPOTHYROIDISM, UNSPECIFIED: ICD-10-CM

## 2025-01-20 DIAGNOSIS — D50.0 IRON DEFICIENCY ANEMIA SECONDARY TO BLOOD LOSS (CHRONIC): ICD-10-CM

## 2025-01-20 LAB
ERYTHROCYTE [DISTWIDTH] IN BLOOD BY AUTOMATED COUNT: 20.1 % (ref 10–15)
HCT VFR BLD AUTO: 44.8 % (ref 35–47)
HGB BLD-MCNC: 14.1 G/DL (ref 11.7–15.7)
MCH RBC QN AUTO: 25.6 PG (ref 26.5–33)
MCHC RBC AUTO-ENTMCNC: 31.5 G/DL (ref 31.5–36.5)
MCV RBC AUTO: 81 FL (ref 78–100)
PLATELET # BLD AUTO: 398 10E3/UL (ref 150–450)
RBC # BLD AUTO: 5.51 10E6/UL (ref 3.8–5.2)
WBC # BLD AUTO: 6.9 10E3/UL (ref 4–11)

## 2025-01-20 PROCEDURE — 85014 HEMATOCRIT: CPT | Performed by: OBSTETRICS & GYNECOLOGY

## 2025-01-20 PROCEDURE — 84443 ASSAY THYROID STIM HORMONE: CPT | Performed by: OBSTETRICS & GYNECOLOGY

## 2025-01-20 PROCEDURE — 82728 ASSAY OF FERRITIN: CPT | Performed by: OBSTETRICS & GYNECOLOGY

## 2025-01-20 PROCEDURE — 80048 BASIC METABOLIC PNL TOTAL CA: CPT | Performed by: OBSTETRICS & GYNECOLOGY

## 2025-01-20 PROCEDURE — 84132 ASSAY OF SERUM POTASSIUM: CPT | Performed by: OBSTETRICS & GYNECOLOGY

## 2025-01-20 PROCEDURE — 84439 ASSAY OF FREE THYROXINE: CPT | Performed by: OBSTETRICS & GYNECOLOGY

## 2025-01-21 LAB
ANION GAP SERPL CALCULATED.3IONS-SCNC: 12 MMOL/L (ref 7–15)
BUN SERPL-MCNC: 12.9 MG/DL (ref 6–20)
CALCIUM SERPL-MCNC: 9.7 MG/DL (ref 8.8–10.4)
CHLORIDE SERPL-SCNC: 104 MMOL/L (ref 98–107)
CREAT SERPL-MCNC: 0.68 MG/DL (ref 0.51–0.95)
EGFRCR SERPLBLD CKD-EPI 2021: >90 ML/MIN/1.73M2
FERRITIN SERPL-MCNC: 24 NG/ML (ref 11–328)
GLUCOSE SERPL-MCNC: 83 MG/DL (ref 70–99)
HCO3 SERPL-SCNC: 22 MMOL/L (ref 22–29)
POTASSIUM SERPL-SCNC: 4.5 MMOL/L (ref 3.4–5.3)
SODIUM SERPL-SCNC: 138 MMOL/L (ref 135–145)
T4 FREE SERPL-MCNC: 1.59 NG/DL (ref 0.9–1.7)
TSH SERPL DL<=0.005 MIU/L-ACNC: 0.73 UIU/ML (ref 0.3–4.2)

## 2025-05-30 ENCOUNTER — HOSPITAL ENCOUNTER (OUTPATIENT)
Facility: CLINIC | Age: 50
End: 2025-05-30
Attending: OBSTETRICS & GYNECOLOGY | Admitting: OBSTETRICS & GYNECOLOGY
Payer: COMMERCIAL

## 2025-06-03 ENCOUNTER — LAB REQUISITION (OUTPATIENT)
Dept: LAB | Facility: CLINIC | Age: 50
End: 2025-06-03

## 2025-06-03 DIAGNOSIS — Z87.448 PERSONAL HISTORY OF OTHER DISEASES OF URINARY SYSTEM: ICD-10-CM

## 2025-06-03 PROCEDURE — 80048 BASIC METABOLIC PNL TOTAL CA: CPT | Performed by: FAMILY MEDICINE

## 2025-06-03 RX ORDER — CEFAZOLIN SODIUM/WATER 2 G/20 ML
2 SYRINGE (ML) INTRAVENOUS
Status: CANCELLED | OUTPATIENT
Start: 2025-06-03

## 2025-06-03 RX ORDER — CEFAZOLIN SODIUM/WATER 2 G/20 ML
2 SYRINGE (ML) INTRAVENOUS SEE ADMIN INSTRUCTIONS
Status: CANCELLED | OUTPATIENT
Start: 2025-06-03

## 2025-06-03 RX ORDER — ACETAMINOPHEN 325 MG/1
975 TABLET ORAL ONCE
Status: CANCELLED | OUTPATIENT
Start: 2025-06-03 | End: 2025-06-03

## 2025-06-04 LAB
ANION GAP SERPL CALCULATED.3IONS-SCNC: 11 MMOL/L (ref 7–15)
BUN SERPL-MCNC: 13.8 MG/DL (ref 6–20)
CALCIUM SERPL-MCNC: 9.6 MG/DL (ref 8.8–10.4)
CHLORIDE SERPL-SCNC: 108 MMOL/L (ref 98–107)
CREAT SERPL-MCNC: 0.8 MG/DL (ref 0.51–0.95)
EGFRCR SERPLBLD CKD-EPI 2021: 89 ML/MIN/1.73M2
GLUCOSE SERPL-MCNC: 96 MG/DL (ref 70–99)
HCO3 SERPL-SCNC: 21 MMOL/L (ref 22–29)
POTASSIUM SERPL-SCNC: 4.4 MMOL/L (ref 3.4–5.3)
SODIUM SERPL-SCNC: 140 MMOL/L (ref 135–145)